# Patient Record
Sex: MALE | Race: WHITE | NOT HISPANIC OR LATINO | Employment: OTHER | URBAN - METROPOLITAN AREA
[De-identification: names, ages, dates, MRNs, and addresses within clinical notes are randomized per-mention and may not be internally consistent; named-entity substitution may affect disease eponyms.]

---

## 2023-03-03 LAB
ALBUMIN (MG/L) IN URINE: 80 MG/L
ALBUMIN/CREATININE (UG/MG) IN URINE: 138.9 UG/MG CRT (ref 0–30)
APPEARANCE, URINE: ABNORMAL
BILIRUBIN, URINE: NEGATIVE
BLOOD, URINE: ABNORMAL
CALCIDIOL (25 OH VITAMIN D3) (NG/ML) IN SER/PLAS: 92 NG/ML
COLOR, URINE: ABNORMAL
CREATININE (MG/DL) IN URINE: 57.6 MG/DL (ref 20–370)
ERYTHROCYTE DISTRIBUTION WIDTH (RATIO) BY AUTOMATED COUNT: 16.6 % (ref 11.5–14.5)
ERYTHROCYTE MEAN CORPUSCULAR HEMOGLOBIN CONCENTRATION (G/DL) BY AUTOMATED: 31.9 G/DL (ref 32–36)
ERYTHROCYTE MEAN CORPUSCULAR VOLUME (FL) BY AUTOMATED COUNT: 85 FL (ref 80–100)
ERYTHROCYTES (10*6/UL) IN BLOOD BY AUTOMATED COUNT: 4.84 X10E12/L (ref 4.5–5.9)
GLUCOSE, URINE: NEGATIVE MG/DL
HEMATOCRIT (%) IN BLOOD BY AUTOMATED COUNT: 41.1 % (ref 41–52)
HEMOGLOBIN (G/DL) IN BLOOD: 13.1 G/DL (ref 13.5–17.5)
KETONES, URINE: NEGATIVE MG/DL
LEUKOCYTE ESTERASE, URINE: ABNORMAL
LEUKOCYTES (10*3/UL) IN BLOOD BY AUTOMATED COUNT: 4.7 X10E9/L (ref 4.4–11.3)
NITRITE, URINE: NEGATIVE
PARATHYRIN INTACT (PG/ML) IN SER/PLAS: 84.4 PG/ML (ref 18.5–88)
PH, URINE: 8 (ref 5–8)
PLATELETS (10*3/UL) IN BLOOD AUTOMATED COUNT: 166 X10E9/L (ref 150–450)
PROTEIN, URINE: ABNORMAL MG/DL
RBC, URINE: >182 /HPF (ref 0–5)
SPECIFIC GRAVITY, URINE: 1.01 (ref 1–1.03)
UROBILINOGEN, URINE: <2 MG/DL (ref 0–1.9)
WBC, URINE: 64 /HPF (ref 0–5)

## 2023-03-13 DIAGNOSIS — E55.9 VITAMIN D DEFICIENCY: Primary | ICD-10-CM

## 2023-03-13 RX ORDER — FINASTERIDE 5 MG/1
1 TABLET, FILM COATED ORAL DAILY
COMMUNITY
Start: 2023-03-07 | End: 2024-02-19 | Stop reason: SDUPTHER

## 2023-03-13 RX ORDER — ACETAMINOPHEN 500 MG
50 TABLET ORAL DAILY
Qty: 30 CAPSULE | Refills: 2 | Status: SHIPPED | OUTPATIENT
Start: 2023-03-13 | End: 2023-04-12

## 2023-03-13 RX ORDER — PANTOPRAZOLE SODIUM 40 MG/1
1 TABLET, DELAYED RELEASE ORAL DAILY
COMMUNITY
Start: 2019-01-30 | End: 2023-06-27 | Stop reason: ALTCHOICE

## 2023-03-13 RX ORDER — FUROSEMIDE 40 MG/1
1 TABLET ORAL DAILY
COMMUNITY
Start: 2022-05-03 | End: 2023-06-27 | Stop reason: ALTCHOICE

## 2023-03-13 RX ORDER — CARVEDILOL 3.12 MG/1
1 TABLET ORAL 2 TIMES DAILY
COMMUNITY
Start: 2019-01-30 | End: 2023-06-27 | Stop reason: SINTOL

## 2023-03-13 RX ORDER — ATORVASTATIN CALCIUM 10 MG/1
1 TABLET, FILM COATED ORAL DAILY
COMMUNITY
Start: 2019-01-30 | End: 2023-05-08

## 2023-03-13 RX ORDER — ALBUTEROL SULFATE 90 UG/1
AEROSOL, METERED RESPIRATORY (INHALATION)
COMMUNITY
Start: 2021-06-04 | End: 2023-06-27 | Stop reason: ALTCHOICE

## 2023-03-13 RX ORDER — OMEGA-3-ACID ETHYL ESTERS 1 G/1
1 CAPSULE, LIQUID FILLED ORAL DAILY
Status: ON HOLD | COMMUNITY
Start: 2019-12-09 | End: 2024-02-29 | Stop reason: ALTCHOICE

## 2023-03-13 RX ORDER — LOSARTAN POTASSIUM 25 MG/1
1 TABLET ORAL DAILY
COMMUNITY
Start: 2022-05-03

## 2023-03-13 RX ORDER — FLUTICASONE FUROATE, UMECLIDINIUM BROMIDE AND VILANTEROL TRIFENATATE 200; 62.5; 25 UG/1; UG/1; UG/1
1 POWDER RESPIRATORY (INHALATION) DAILY
COMMUNITY
Start: 2021-11-01 | End: 2023-06-27 | Stop reason: ALTCHOICE

## 2023-03-13 RX ORDER — ACETAMINOPHEN 500 MG
1 TABLET ORAL DAILY
COMMUNITY
Start: 2021-09-03 | End: 2023-03-13 | Stop reason: SDUPTHER

## 2023-03-13 RX ORDER — ISOSORBIDE MONONITRATE 30 MG/1
1 TABLET, EXTENDED RELEASE ORAL DAILY
COMMUNITY
Start: 2021-06-28

## 2023-03-13 RX ORDER — RAMELTEON 8 MG/1
1 TABLET ORAL NIGHTLY PRN
COMMUNITY
Start: 2018-09-14 | End: 2023-07-10

## 2023-03-13 RX ORDER — FLUTICASONE PROPIONATE 50 MCG
SPRAY, SUSPENSION (ML) NASAL
COMMUNITY
Start: 2022-07-15 | End: 2023-05-30

## 2023-03-13 RX ORDER — FERROUS SULFATE 325(65) MG
1 TABLET ORAL 2 TIMES DAILY
COMMUNITY
Start: 2022-07-21

## 2023-03-13 RX ORDER — ACETAMINOPHEN 325 MG/1
TABLET ORAL EVERY 6 HOURS PRN
COMMUNITY
Start: 2019-01-12

## 2023-03-13 RX ORDER — ASPIRIN 81 MG/1
1 TABLET ORAL DAILY
COMMUNITY
Start: 2019-01-12

## 2023-04-15 DIAGNOSIS — N13.8 BPH WITH URINARY OBSTRUCTION: Primary | ICD-10-CM

## 2023-04-15 DIAGNOSIS — N40.1 BPH WITH URINARY OBSTRUCTION: Primary | ICD-10-CM

## 2023-04-17 ENCOUNTER — APPOINTMENT (OUTPATIENT)
Dept: LAB | Facility: LAB | Age: 83
End: 2023-04-17
Payer: MEDICARE

## 2023-04-17 PROBLEM — R60.0 BILATERAL LEG EDEMA: Status: ACTIVE | Noted: 2023-04-17

## 2023-04-17 PROBLEM — I35.2 AORTIC INSUFFICIENCY WITH AORTIC STENOSIS: Status: ACTIVE | Noted: 2023-04-17

## 2023-04-17 PROBLEM — N40.1 BPH WITH URINARY OBSTRUCTION: Status: ACTIVE | Noted: 2023-04-17

## 2023-04-17 PROBLEM — N17.9 AKI (ACUTE KIDNEY INJURY) (CMS-HCC): Status: ACTIVE | Noted: 2023-04-17

## 2023-04-17 PROBLEM — N13.8 BPH WITH URINARY OBSTRUCTION: Status: ACTIVE | Noted: 2023-04-17

## 2023-04-17 PROBLEM — J44.9 COPD (CHRONIC OBSTRUCTIVE PULMONARY DISEASE) (MULTI): Status: ACTIVE | Noted: 2023-04-17

## 2023-04-17 PROBLEM — K21.9 GASTROESOPHAGEAL REFLUX DISEASE: Status: ACTIVE | Noted: 2021-06-09

## 2023-04-17 PROBLEM — I50.9 CONGESTIVE HEART FAILURE (MULTI): Status: ACTIVE | Noted: 2021-06-09

## 2023-04-17 LAB
ALBUMIN (G/DL) IN SER/PLAS: 4.1 G/DL (ref 3.4–5)
ALBUMIN (MG/L) IN URINE: 21.3 MG/L
ALBUMIN/CREATININE (UG/MG) IN URINE: 72 UG/MG CRT (ref 0–30)
ANION GAP IN SER/PLAS: 12 MMOL/L (ref 10–20)
APPEARANCE, URINE: CLEAR
BASOPHILS (10*3/UL) IN BLOOD BY AUTOMATED COUNT: 0.03 X10E9/L (ref 0–0.1)
BASOPHILS/100 LEUKOCYTES IN BLOOD BY AUTOMATED COUNT: 0.7 % (ref 0–2)
BILIRUBIN, URINE: NEGATIVE
BLOOD, URINE: NEGATIVE
CALCIUM (MG/DL) IN SER/PLAS: 9.3 MG/DL (ref 8.6–10.3)
CARBON DIOXIDE, TOTAL (MMOL/L) IN SER/PLAS: 33 MMOL/L (ref 21–32)
CHLORIDE (MMOL/L) IN SER/PLAS: 101 MMOL/L (ref 98–107)
COLOR, URINE: ABNORMAL
CREATININE (MG/DL) IN SER/PLAS: 2.22 MG/DL (ref 0.5–1.3)
CREATININE (MG/DL) IN URINE: 29.6 MG/DL (ref 20–370)
EOSINOPHILS (10*3/UL) IN BLOOD BY AUTOMATED COUNT: 0.16 X10E9/L (ref 0–0.4)
EOSINOPHILS/100 LEUKOCYTES IN BLOOD BY AUTOMATED COUNT: 3.7 % (ref 0–6)
ERYTHROCYTE DISTRIBUTION WIDTH (RATIO) BY AUTOMATED COUNT: 13.8 % (ref 11.5–14.5)
ERYTHROCYTE MEAN CORPUSCULAR HEMOGLOBIN CONCENTRATION (G/DL) BY AUTOMATED: 32 G/DL (ref 32–36)
ERYTHROCYTE MEAN CORPUSCULAR VOLUME (FL) BY AUTOMATED COUNT: 88 FL (ref 80–100)
ERYTHROCYTES (10*6/UL) IN BLOOD BY AUTOMATED COUNT: 4.4 X10E12/L (ref 4.5–5.9)
FERRITIN (UG/LL) IN SER/PLAS: 20 UG/L (ref 20–300)
GFR MALE: 29 ML/MIN/1.73M2
GLUCOSE (MG/DL) IN SER/PLAS: 104 MG/DL (ref 74–99)
GLUCOSE, URINE: NEGATIVE MG/DL
HEMATOCRIT (%) IN BLOOD BY AUTOMATED COUNT: 38.8 % (ref 41–52)
HEMOGLOBIN (G/DL) IN BLOOD: 12.4 G/DL (ref 13.5–17.5)
HYALINE CASTS, URINE: ABNORMAL /LPF
IMMATURE GRANULOCYTES/100 LEUKOCYTES IN BLOOD BY AUTOMATED COUNT: 0.2 % (ref 0–0.9)
IRON (UG/DL) IN SER/PLAS: 46 UG/DL (ref 35–150)
IRON BINDING CAPACITY (UG/DL) IN SER/PLAS: 363 UG/DL (ref 240–445)
IRON SATURATION (%) IN SER/PLAS: 13 % (ref 25–45)
KETONES, URINE: NEGATIVE MG/DL
LEUKOCYTE ESTERASE, URINE: ABNORMAL
LEUKOCYTES (10*3/UL) IN BLOOD BY AUTOMATED COUNT: 4.3 X10E9/L (ref 4.4–11.3)
LYMPHOCYTES (10*3/UL) IN BLOOD BY AUTOMATED COUNT: 0.89 X10E9/L (ref 0.8–3)
LYMPHOCYTES/100 LEUKOCYTES IN BLOOD BY AUTOMATED COUNT: 20.5 % (ref 13–44)
MONOCYTES (10*3/UL) IN BLOOD BY AUTOMATED COUNT: 0.75 X10E9/L (ref 0.05–0.8)
MONOCYTES/100 LEUKOCYTES IN BLOOD BY AUTOMATED COUNT: 17.3 % (ref 2–10)
MUCUS, URINE: ABNORMAL /LPF
NEUTROPHILS (10*3/UL) IN BLOOD BY AUTOMATED COUNT: 2.5 X10E9/L (ref 1.6–5.5)
NEUTROPHILS/100 LEUKOCYTES IN BLOOD BY AUTOMATED COUNT: 57.6 % (ref 40–80)
NITRITE, URINE: NEGATIVE
PH, URINE: 7 (ref 5–8)
PHOSPHATE (MG/DL) IN SER/PLAS: 3.8 MG/DL (ref 2.5–4.9)
PLATELETS (10*3/UL) IN BLOOD AUTOMATED COUNT: 155 X10E9/L (ref 150–450)
POTASSIUM (MMOL/L) IN SER/PLAS: 4.4 MMOL/L (ref 3.5–5.3)
PROTEIN, URINE: NEGATIVE MG/DL
RBC, URINE: 2 /HPF (ref 0–5)
SODIUM (MMOL/L) IN SER/PLAS: 142 MMOL/L (ref 136–145)
SPECIFIC GRAVITY, URINE: 1.01 (ref 1–1.03)
UREA NITROGEN (MG/DL) IN SER/PLAS: 41 MG/DL (ref 6–23)
UROBILINOGEN, URINE: <2 MG/DL (ref 0–1.9)
WBC, URINE: 34 /HPF (ref 0–5)

## 2023-04-17 RX ORDER — OMEPRAZOLE 20 MG/1
CAPSULE, DELAYED RELEASE ORAL
COMMUNITY
Start: 2018-06-22 | End: 2023-06-27 | Stop reason: ALTCHOICE

## 2023-04-17 RX ORDER — TORSEMIDE 20 MG/1
20 TABLET ORAL DAILY
COMMUNITY
Start: 2023-02-23

## 2023-04-17 RX ORDER — ASCORBIC ACID 500 MG
1000 TABLET ORAL EVERY 24 HOURS
COMMUNITY
Start: 2017-08-14

## 2023-04-17 RX ORDER — ERGOCALCIFEROL 1.25 MG/1
CAPSULE ORAL
COMMUNITY
Start: 2018-06-22 | End: 2023-06-27 | Stop reason: ALTCHOICE

## 2023-04-17 RX ORDER — WARFARIN SODIUM 5 MG/1
TABLET ORAL
COMMUNITY
Start: 2018-06-22 | End: 2023-06-27 | Stop reason: ALTCHOICE

## 2023-04-17 RX ORDER — TAMSULOSIN HYDROCHLORIDE 0.4 MG/1
CAPSULE ORAL
Qty: 90 CAPSULE | Refills: 0 | Status: SHIPPED | OUTPATIENT
Start: 2023-04-17 | End: 2023-07-10

## 2023-04-17 RX ORDER — SUCRALFATE 1 G/1
TABLET ORAL
COMMUNITY
Start: 2018-06-22 | End: 2023-06-27 | Stop reason: ALTCHOICE

## 2023-04-17 RX ORDER — TAMSULOSIN HYDROCHLORIDE 0.4 MG/1
0.4 CAPSULE ORAL
COMMUNITY
Start: 2018-06-22 | End: 2023-06-27 | Stop reason: SDUPTHER

## 2023-04-17 RX ORDER — HYDRALAZINE HYDROCHLORIDE 25 MG/1
TABLET, FILM COATED ORAL
COMMUNITY
Start: 2018-06-22 | End: 2023-06-27 | Stop reason: ALTCHOICE

## 2023-04-17 RX ORDER — FAMOTIDINE 20 MG/1
1 TABLET, FILM COATED ORAL DAILY
COMMUNITY
Start: 2023-03-29

## 2023-04-17 RX ORDER — FUROSEMIDE 20 MG/1
20 TABLET ORAL 2 TIMES DAILY
COMMUNITY
Start: 2019-11-12 | End: 2024-02-27 | Stop reason: ALTCHOICE

## 2023-04-17 RX ORDER — WARFARIN 1 MG/1
TABLET ORAL
COMMUNITY
Start: 2018-06-22 | End: 2023-06-27 | Stop reason: ALTCHOICE

## 2023-04-17 RX ORDER — DAPAGLIFLOZIN 10 MG/1
1 TABLET, FILM COATED ORAL DAILY
COMMUNITY
Start: 2022-06-16 | End: 2023-06-27 | Stop reason: SINTOL

## 2023-04-17 RX ORDER — LISINOPRIL 5 MG/1
TABLET ORAL
COMMUNITY
Start: 2018-06-22 | End: 2023-06-27 | Stop reason: ALTCHOICE

## 2023-04-20 LAB — ERYTHROPOIETIN: 11 MU/ML (ref 4–27)

## 2023-05-06 DIAGNOSIS — E78.00 HYPERCHOLESTEROLEMIA: Primary | ICD-10-CM

## 2023-05-08 RX ORDER — ATORVASTATIN CALCIUM 10 MG/1
10 TABLET, FILM COATED ORAL DAILY
Qty: 90 TABLET | Refills: 0 | Status: SHIPPED | OUTPATIENT
Start: 2023-05-08 | End: 2023-08-07

## 2023-05-08 RX ORDER — ACETAMINOPHEN 500 MG
1 TABLET ORAL DAILY
COMMUNITY
Start: 2021-09-03 | End: 2024-04-22

## 2023-05-28 DIAGNOSIS — R09.82 POSTNASAL DRIP: Primary | ICD-10-CM

## 2023-05-30 RX ORDER — FLUTICASONE PROPIONATE 50 MCG
SPRAY, SUSPENSION (ML) NASAL
Qty: 16 G | Refills: 0 | Status: SHIPPED | OUTPATIENT
Start: 2023-05-30 | End: 2023-06-22

## 2023-06-21 DIAGNOSIS — R09.82 POSTNASAL DRIP: ICD-10-CM

## 2023-06-22 RX ORDER — FLUTICASONE PROPIONATE 50 MCG
SPRAY, SUSPENSION (ML) NASAL
Qty: 16 G | Refills: 0 | Status: SHIPPED | OUTPATIENT
Start: 2023-06-22 | End: 2023-07-17

## 2023-06-26 RX ORDER — PERPHENAZINE 2 MG
TABLET ORAL
COMMUNITY
Start: 2023-06-21

## 2023-06-26 ASSESSMENT — ENCOUNTER SYMPTOMS
COLOR CHANGE: 0
APPETITE CHANGE: 0
CHILLS: 0
FEVER: 0
NERVOUS/ANXIOUS: 0
SHORTNESS OF BREATH: 0
ANAL BLEEDING: 0
DIARRHEA: 0
CHOKING: 0
FREQUENCY: 0
COUGH: 0
HEADACHES: 0
WHEEZING: 0
VOMITING: 0
CHEST TIGHTNESS: 0
MYALGIAS: 0
EYE PAIN: 0
ABDOMINAL PAIN: 0
EYE DISCHARGE: 0
ARTHRALGIAS: 0
PALPITATIONS: 0
ABDOMINAL DISTENTION: 0
DIFFICULTY URINATING: 0
CONSTIPATION: 0
HEMATURIA: 0
SORE THROAT: 0
WEAKNESS: 0
NAUSEA: 0
DIZZINESS: 0
FACIAL SWELLING: 0
CONFUSION: 0
SLEEP DISTURBANCE: 0
TREMORS: 0
POLYPHAGIA: 0
NUMBNESS: 0
FATIGUE: 0
JOINT SWELLING: 0
POLYDIPSIA: 0

## 2023-06-26 NOTE — PROGRESS NOTES
Subjective   Patient ID: Nahum Steward is a 83 y.o. male with a history of History of CABG in 2000, coronary artery bypass graft surgery with   saphenous vein graft to right coronary artery, replacement of mechanical valve with tissue valve in 2019, hypertension, CHF, insomnia, hyperlipidemia, APRYL is presented who presents for Follow-up.    HPI the patient lost 35 pounds since January.  Stated he is on the intermittent fasting and feels a lot better.  His shortness of breath also improved and he is off of Trelegy.  His carvedilol was discontinued by cardiology due to bradycardia.  Stated blood pressure is well controlled. His gross hematuria had resolved after he stopped Farxiga.  Stated he occasionally had hematuria and he was started on finasteride by urology which improved it.  He denies leg swelling.  Takes furosemide daily.  His anemia had improved.  He follows with hematology regularly.  Currently on oral iron supplement.    Review of Systems   Constitutional:  Negative for appetite change, chills, fatigue and fever.   HENT:  Negative for congestion, ear pain, facial swelling, hearing loss, nosebleeds and sore throat.    Eyes:  Negative for pain, discharge and visual disturbance.   Respiratory:  Negative for cough, choking, chest tightness, shortness of breath and wheezing.    Cardiovascular:  Negative for chest pain, palpitations and leg swelling.   Gastrointestinal:  Negative for abdominal distention, abdominal pain, anal bleeding, constipation, diarrhea, nausea and vomiting.   Endocrine: Negative for cold intolerance, heat intolerance, polydipsia, polyphagia and polyuria.   Genitourinary:  Negative for difficulty urinating, frequency, hematuria and urgency.   Musculoskeletal:  Negative for arthralgias, gait problem, joint swelling and myalgias.   Skin:  Negative for color change and rash.   Neurological:  Negative for dizziness, tremors, syncope, weakness, numbness and headaches.   Psychiatric/Behavioral:   "Negative for behavioral problems, confusion, sleep disturbance and suicidal ideas. The patient is not nervous/anxious.        Objective   /72   Temp 36.2 °C (97.2 °F)   Ht 1.689 m (5' 6.5\")   Wt 84.4 kg (186 lb)   BMI 29.57 kg/m²     Physical Exam  Constitutional:       General: He is not in acute distress.     Appearance: Normal appearance.   HENT:      Head: Normocephalic and atraumatic.      Nose: Nose normal.   Eyes:      Extraocular Movements: Extraocular movements intact.      Conjunctiva/sclera: Conjunctivae normal.      Pupils: Pupils are equal, round, and reactive to light.   Cardiovascular:      Rate and Rhythm: Normal rate and regular rhythm.      Pulses: Normal pulses.      Heart sounds: Normal heart sounds.   Pulmonary:      Effort: Pulmonary effort is normal.      Breath sounds: Normal breath sounds.   Abdominal:      General: Bowel sounds are normal.      Palpations: Abdomen is soft.   Musculoskeletal:         General: Normal range of motion.      Cervical back: Normal range of motion and neck supple.   Neurological:      General: No focal deficit present.      Mental Status: He is alert and oriented to person, place, and time.   Psychiatric:         Mood and Affect: Mood normal.         Behavior: Behavior normal.         Thought Content: Thought content normal.         Judgment: Judgment normal.         Assessment/Plan     Hematuria  off of Farxiga  Resolved   cytology showed acute inflammation  Started on finasteride 5 mg by urology  follow up with urology as scheduled     BPH  Continue tamsulosin 0.4 mg once daily  Started on Finasteride 5 mg by urology  Follow-up with urology Dr. Meehan     Anemia   Improved  Continue oral iron supplement twice daily  Follow-up with hematology    Leg edema  Stable  Elevate your feet  Wear compression stockings  Continue furosemide 40 mg daily     COPD  Shortness of breath improved after losing 35 pounds  Off of Trelegy  Continue albuterol as " needed  follow up with pulmonology      CHF  Stable  Continue furosemide 20 mg twice daily  Continue losartan 25 mg daily  Off of carvedilol 3.125 mg due to bradycardia  Check stress test done in 2019  Follow-up with cardiology Dr. Garcia as scheduled     History of CABG in 2000,   coronary artery bypass graft surgery with   saphenous vein graft to right coronary artery,   replacement of mechanical valve with tissue valve in 2019,   Stable now  Continue blood pressure medications  Continue aspirin 81 mg daily  Continue atorvastatin 10 mg daily  Follow-up with cardiology as scheduled     High blood pressure   Off of carvedilol 3.125 mg due to bradycardia  Continue furosemide 40 mg daily  Continue losartan 25 mg daily  Monitor blood pressure daily  No added salt diet, do not add salt to your food  Try to exercise every other day for 30 minutes     Dyslipidemia  Continue with the low fat, low cholesterol diet  I recommend Mediterranean diet, which include fish, chicken, vegetables and olive oil  Exercise daily for 30 minutes at least 3 times a week  Continue atorvastatin 10 mg daily  Continue omega 3 daily 1000 mg daily    Acid reflux  Avoid caffeine and alcoholic beverages  Avoid spicy and acidic food, such as tomato and citrus fruits  Avoid onions, peppermint and spearmint   Eat small, frequent meals  Do not eat late at night, at least 3 hours before bed  Raise the head of the bed 6-8 inches for sleeping  Wear lose-fitting clothes around your waist   Continue famotidine 20 mg twice daily before meals     Insomnia  Continue ramelteon 8 mg at bedtime    BMI 29.57 kg/m2  Lost 35 pounds with intermittent fasting  Low fat, low cholesterol diet, low carbohydrate diet  Exercise at least 30 minutes daily     Wellness exam  Colonoscopy done in 2018 repeat in 5 years, patient declined since last colonoscopy was normal  Pneumovax 9/2/2022  Flu vaccine 7/1/2022  COVID-vaccine 3/29/2022  See your dentist twice a year  Yearly eye  exam  see dermatology once a year for a skin check.    Please get your Living will / Advance directive completed. Please make sure our office has a copy of the latest one.

## 2023-06-27 ENCOUNTER — OFFICE VISIT (OUTPATIENT)
Dept: PRIMARY CARE | Facility: CLINIC | Age: 83
End: 2023-06-27
Payer: MEDICARE

## 2023-06-27 VITALS
SYSTOLIC BLOOD PRESSURE: 130 MMHG | HEIGHT: 67 IN | BODY MASS INDEX: 29.19 KG/M2 | TEMPERATURE: 97.2 F | WEIGHT: 186 LBS | DIASTOLIC BLOOD PRESSURE: 72 MMHG

## 2023-06-27 DIAGNOSIS — I73.9 INTERMITTENT CLAUDICATION (CMS-HCC): ICD-10-CM

## 2023-06-27 DIAGNOSIS — F51.01 PRIMARY INSOMNIA: ICD-10-CM

## 2023-06-27 DIAGNOSIS — R35.1 NOCTURIA: ICD-10-CM

## 2023-06-27 DIAGNOSIS — R26.81 UNSTEADY GAIT: ICD-10-CM

## 2023-06-27 DIAGNOSIS — N40.1 BPH WITH URINARY OBSTRUCTION: ICD-10-CM

## 2023-06-27 DIAGNOSIS — I10 BENIGN ESSENTIAL HYPERTENSION: ICD-10-CM

## 2023-06-27 DIAGNOSIS — I50.9 CONGESTIVE HEART FAILURE, UNSPECIFIED HF CHRONICITY, UNSPECIFIED HEART FAILURE TYPE (MULTI): ICD-10-CM

## 2023-06-27 DIAGNOSIS — D50.0 IRON DEFICIENCY ANEMIA DUE TO CHRONIC BLOOD LOSS: ICD-10-CM

## 2023-06-27 DIAGNOSIS — Z00.00 ROUTINE GENERAL MEDICAL EXAMINATION AT HEALTH CARE FACILITY: Primary | ICD-10-CM

## 2023-06-27 DIAGNOSIS — J41.0 SIMPLE CHRONIC BRONCHITIS (MULTI): ICD-10-CM

## 2023-06-27 DIAGNOSIS — N17.9 AKI (ACUTE KIDNEY INJURY) (CMS-HCC): ICD-10-CM

## 2023-06-27 DIAGNOSIS — K21.9 GASTROESOPHAGEAL REFLUX DISEASE WITHOUT ESOPHAGITIS: ICD-10-CM

## 2023-06-27 DIAGNOSIS — R60.0 BILATERAL LEG EDEMA: ICD-10-CM

## 2023-06-27 DIAGNOSIS — N13.8 BPH WITH URINARY OBSTRUCTION: ICD-10-CM

## 2023-06-27 PROBLEM — R91.1 LUNG NODULE: Status: ACTIVE | Noted: 2023-06-27

## 2023-06-27 PROBLEM — R09.82 POST-NASAL DRIP: Status: ACTIVE | Noted: 2023-06-27

## 2023-06-27 PROBLEM — I44.1 MOBITZ TYPE II ATRIOVENTRICULAR BLOCK: Status: ACTIVE | Noted: 2018-06-06

## 2023-06-27 PROBLEM — E78.2 MIXED HYPERLIPIDEMIA: Status: ACTIVE | Noted: 2023-06-27

## 2023-06-27 PROBLEM — Z95.2 H/O AORTIC VALVE REPLACEMENT: Status: ACTIVE | Noted: 2023-06-27

## 2023-06-27 PROCEDURE — 99214 OFFICE O/P EST MOD 30 MIN: CPT | Performed by: PHYSICIAN ASSISTANT

## 2023-06-27 PROCEDURE — 1160F RVW MEDS BY RX/DR IN RCRD: CPT | Performed by: PHYSICIAN ASSISTANT

## 2023-06-27 PROCEDURE — 1157F ADVNC CARE PLAN IN RCRD: CPT | Performed by: PHYSICIAN ASSISTANT

## 2023-06-27 PROCEDURE — 1159F MED LIST DOCD IN RCRD: CPT | Performed by: PHYSICIAN ASSISTANT

## 2023-06-27 PROCEDURE — 1036F TOBACCO NON-USER: CPT | Performed by: PHYSICIAN ASSISTANT

## 2023-06-27 PROCEDURE — 3078F DIAST BP <80 MM HG: CPT | Performed by: PHYSICIAN ASSISTANT

## 2023-06-27 PROCEDURE — 1170F FXNL STATUS ASSESSED: CPT | Performed by: PHYSICIAN ASSISTANT

## 2023-06-27 PROCEDURE — 3075F SYST BP GE 130 - 139MM HG: CPT | Performed by: PHYSICIAN ASSISTANT

## 2023-06-27 PROCEDURE — G0439 PPPS, SUBSEQ VISIT: HCPCS | Performed by: PHYSICIAN ASSISTANT

## 2023-06-27 ASSESSMENT — ACTIVITIES OF DAILY LIVING (ADL)
FEEDING: INDEPENDENT
HEARING - LEFT EAR: FUNCTIONAL
NEEDS ASSISTANCE WITH FOOD: INDEPENDENT
TAKING MEDICATION: INDEPENDENT
BATHING: INDEPENDENT
PREPARING MEALS: INDEPENDENT
GROCERY SHOPPING: INDEPENDENT
DRESSING YOURSELF: INDEPENDENT
FEEDING YOURSELF: INDEPENDENT
EATING: INDEPENDENT
ADEQUATE_TO_COMPLETE_ADL: YES
BATHING: INDEPENDENT
STIL DRIVING: NO
JUDGMENT_ADEQUATE_SAFELY_COMPLETE_DAILY_ACTIVITIES: YES
USING TELEPHONE: INDEPENDENT
GROOMING: INDEPENDENT
MANAGING FINANCES: INDEPENDENT
ADEQUATE_TO_COMPLETE_ADL: YES
WALKS IN HOME: INDEPENDENT
HEARING - RIGHT EAR: FUNCTIONAL
TOILETING: INDEPENDENT
USING TRANSPORTATION: NEEDS ASSISTANCE
TOILETING: INDEPENDENT
PILL BOX USED: YES
JUDGMENT_ADEQUATE_SAFELY_COMPLETE_DAILY_ACTIVITIES: YES
DRESSING: INDEPENDENT
DOING HOUSEWORK: INDEPENDENT
PATIENT'S MEMORY ADEQUATE TO SAFELY COMPLETE DAILY ACTIVITIES?: YES

## 2023-06-27 ASSESSMENT — ENCOUNTER SYMPTOMS
OCCASIONAL FEELINGS OF UNSTEADINESS: 0
LOSS OF SENSATION IN FEET: 0
DEPRESSION: 0

## 2023-06-27 ASSESSMENT — GERIATRIC MINI NUTRITIONAL ASSESSMENT (MNA)
A HAS FOOD INTAKE DECLINED OVER THE PAST 3 MONTHS DUE TO LOSS OF APPETITE, DIGESTIVE PROBLEMS, CHEWING OR SWALLOWING DIFFICULTIES?: NO DECREASE IN FOOD INTAKE
C GENERAL MOBILITY: GOES OUT
D HAS SUFFERED PSYCHOLOGICAL STRESS OR ACUTE DISEASE IN THE PAST 3 MONTHS?: NO
B WEIGHT LOSS DURING THE LAST 3 MONTHS: NO WEIGHT LOSS
E NEUROPSYCHOLOGICAL PROBLEMS: NO PSYCHOLOGICAL PROBLEMS

## 2023-06-27 ASSESSMENT — PATIENT HEALTH QUESTIONNAIRE - PHQ9
2. FEELING DOWN, DEPRESSED OR HOPELESS: NOT AT ALL
1. LITTLE INTEREST OR PLEASURE IN DOING THINGS: NOT AT ALL
SUM OF ALL RESPONSES TO PHQ9 QUESTIONS 1 AND 2: 0

## 2023-06-27 ASSESSMENT — COLUMBIA-SUICIDE SEVERITY RATING SCALE - C-SSRS
1. IN THE PAST MONTH, HAVE YOU WISHED YOU WERE DEAD OR WISHED YOU COULD GO TO SLEEP AND NOT WAKE UP?: NO
6. HAVE YOU EVER DONE ANYTHING, STARTED TO DO ANYTHING, OR PREPARED TO DO ANYTHING TO END YOUR LIFE?: NO
2. HAVE YOU ACTUALLY HAD ANY THOUGHTS OF KILLING YOURSELF?: NO

## 2023-06-27 ASSESSMENT — COGNITIVE AND FUNCTIONAL STATUS - GENERAL: TRAIL MAKING TEST: PATIENT COMPLETES TRAIL MAKING TEST PROPERLY.

## 2023-06-27 NOTE — PROGRESS NOTES
"Subjective   Reason for Visit: Nahum Steward is an 83 y.o. male here for a Medicare Wellness visit.     Past Medical, Surgical, and Family History reviewed and updated in chart.    Reviewed all medications by prescribing practitioner or clinical pharmacist (such as prescriptions, OTCs, herbal therapies and supplements) and documented in the medical record.    HPI    Patient Care Team:  Kim Dill PA-C as PCP - General     Review of Systems    Objective   Vitals:  /72   Temp 36.2 °C (97.2 °F)   Ht 1.689 m (5' 6.5\")   Wt 84.4 kg (186 lb)   BMI 29.57 kg/m²       Physical Exam    Assessment/Plan   Problem List Items Addressed This Visit       APRYL (acute kidney injury) (CMS/HCC)    Benign essential hypertension    Bilateral leg edema    BPH with urinary obstruction    Congestive heart failure (CMS/HCC)    COPD (chronic obstructive pulmonary disease) (CMS/HCC)    Gastroesophageal reflux disease    Insomnia    Intermittent claudication (CMS/HCC)    Unsteady gait    Iron deficiency anemia    Nocturia    Routine general medical examination at health care facility - Primary    Relevant Orders    1 Year Follow Up In Primary Care - Wellness Exam          "

## 2023-07-07 DIAGNOSIS — F51.01 PRIMARY INSOMNIA: Primary | ICD-10-CM

## 2023-07-09 DIAGNOSIS — N40.1 BPH WITH URINARY OBSTRUCTION: ICD-10-CM

## 2023-07-09 DIAGNOSIS — N13.8 BPH WITH URINARY OBSTRUCTION: ICD-10-CM

## 2023-07-10 RX ORDER — RAMELTEON 8 MG/1
TABLET ORAL
Qty: 90 TABLET | Refills: 0 | Status: SHIPPED | OUTPATIENT
Start: 2023-07-10 | End: 2023-10-10

## 2023-07-10 RX ORDER — TAMSULOSIN HYDROCHLORIDE 0.4 MG/1
CAPSULE ORAL
Qty: 90 CAPSULE | Refills: 3 | Status: SHIPPED | OUTPATIENT
Start: 2023-07-10

## 2023-07-16 DIAGNOSIS — R09.82 POSTNASAL DRIP: ICD-10-CM

## 2023-07-17 RX ORDER — FLUTICASONE PROPIONATE 50 MCG
SPRAY, SUSPENSION (ML) NASAL
Qty: 16 G | Refills: 0 | Status: SHIPPED | OUTPATIENT
Start: 2023-07-17 | End: 2023-09-14

## 2023-08-05 DIAGNOSIS — E78.00 HYPERCHOLESTEROLEMIA: ICD-10-CM

## 2023-08-07 RX ORDER — ATORVASTATIN CALCIUM 10 MG/1
10 TABLET, FILM COATED ORAL DAILY
Qty: 90 TABLET | Refills: 0 | Status: SHIPPED | OUTPATIENT
Start: 2023-08-07 | End: 2023-10-16

## 2023-08-28 LAB
ALBUMIN (G/DL) IN SER/PLAS: 4.4 G/DL (ref 3.4–5)
ALBUMIN (MG/L) IN URINE: 35 MG/L
ALBUMIN/CREATININE (UG/MG) IN URINE: 51 UG/MG CRT (ref 0–30)
ANION GAP IN SER/PLAS: 14 MMOL/L (ref 10–20)
APPEARANCE, URINE: CLEAR
BILIRUBIN, URINE: NEGATIVE
BLOOD, URINE: NEGATIVE
CALCIDIOL (25 OH VITAMIN D3) (NG/ML) IN SER/PLAS: 64 NG/ML
CALCIUM (MG/DL) IN SER/PLAS: 9 MG/DL (ref 8.6–10.3)
CARBON DIOXIDE, TOTAL (MMOL/L) IN SER/PLAS: 29 MMOL/L (ref 21–32)
CHLORIDE (MMOL/L) IN SER/PLAS: 103 MMOL/L (ref 98–107)
COLOR, URINE: YELLOW
CREATININE (MG/DL) IN SER/PLAS: 1.96 MG/DL (ref 0.5–1.3)
CREATININE (MG/DL) IN URINE: 68.6 MG/DL (ref 20–370)
GFR MALE: 34 ML/MIN/1.73M2
GLUCOSE (MG/DL) IN SER/PLAS: 89 MG/DL (ref 74–99)
GLUCOSE, URINE: NEGATIVE MG/DL
KETONES, URINE: NEGATIVE MG/DL
LEUKOCYTE ESTERASE, URINE: ABNORMAL
MUCUS, URINE: ABNORMAL /LPF
NITRITE, URINE: NEGATIVE
PH, URINE: 6 (ref 5–8)
PHOSPHATE (MG/DL) IN SER/PLAS: 3.1 MG/DL (ref 2.5–4.9)
POTASSIUM (MMOL/L) IN SER/PLAS: 3.8 MMOL/L (ref 3.5–5.3)
PROTEIN, URINE: NEGATIVE MG/DL
RBC, URINE: 1 /HPF (ref 0–5)
SODIUM (MMOL/L) IN SER/PLAS: 142 MMOL/L (ref 136–145)
SPECIFIC GRAVITY, URINE: 1.01 (ref 1–1.03)
UREA NITROGEN (MG/DL) IN SER/PLAS: 40 MG/DL (ref 6–23)
UROBILINOGEN, URINE: <2 MG/DL (ref 0–1.9)
WBC, URINE: 25 /HPF (ref 0–5)

## 2023-09-12 DIAGNOSIS — R09.82 POSTNASAL DRIP: ICD-10-CM

## 2023-09-14 RX ORDER — FLUTICASONE PROPIONATE 50 MCG
SPRAY, SUSPENSION (ML) NASAL
Qty: 16 G | Refills: 0 | Status: SHIPPED | OUTPATIENT
Start: 2023-09-14 | End: 2023-10-09

## 2023-10-07 DIAGNOSIS — R09.82 POSTNASAL DRIP: ICD-10-CM

## 2023-10-09 DIAGNOSIS — R09.82 POSTNASAL DRIP: ICD-10-CM

## 2023-10-09 RX ORDER — FLUTICASONE PROPIONATE 50 MCG
SPRAY, SUSPENSION (ML) NASAL
Qty: 16 G | Refills: 0 | Status: SHIPPED | OUTPATIENT
Start: 2023-10-09 | End: 2023-10-09 | Stop reason: SDUPTHER

## 2023-10-09 RX ORDER — FLUTICASONE PROPIONATE 50 MCG
SPRAY, SUSPENSION (ML) NASAL
Qty: 16 G | Refills: 2 | Status: SHIPPED | OUTPATIENT
Start: 2023-10-09 | End: 2024-02-05

## 2023-10-10 DIAGNOSIS — F51.01 PRIMARY INSOMNIA: ICD-10-CM

## 2023-10-10 RX ORDER — RAMELTEON 8 MG/1
TABLET ORAL
Qty: 90 TABLET | Refills: 0 | Status: SHIPPED | OUTPATIENT
Start: 2023-10-10 | End: 2024-01-09

## 2023-10-14 DIAGNOSIS — E78.00 HYPERCHOLESTEROLEMIA: ICD-10-CM

## 2023-10-16 RX ORDER — ATORVASTATIN CALCIUM 10 MG/1
10 TABLET, FILM COATED ORAL DAILY
Qty: 90 TABLET | Refills: 0 | Status: SHIPPED | OUTPATIENT
Start: 2023-10-16 | End: 2024-02-05

## 2023-12-27 ENCOUNTER — TELEPHONE (OUTPATIENT)
Dept: PRIMARY CARE | Facility: CLINIC | Age: 83
End: 2023-12-27

## 2023-12-27 ENCOUNTER — OFFICE VISIT (OUTPATIENT)
Dept: PRIMARY CARE | Facility: CLINIC | Age: 83
End: 2023-12-27
Payer: MEDICARE

## 2023-12-27 VITALS
HEIGHT: 67 IN | DIASTOLIC BLOOD PRESSURE: 72 MMHG | WEIGHT: 196 LBS | TEMPERATURE: 96.9 F | BODY MASS INDEX: 30.76 KG/M2 | SYSTOLIC BLOOD PRESSURE: 128 MMHG

## 2023-12-27 DIAGNOSIS — J41.0 SIMPLE CHRONIC BRONCHITIS (MULTI): ICD-10-CM

## 2023-12-27 DIAGNOSIS — R26.81 UNSTEADY GAIT: ICD-10-CM

## 2023-12-27 DIAGNOSIS — I50.9 CONGESTIVE HEART FAILURE, UNSPECIFIED HF CHRONICITY, UNSPECIFIED HEART FAILURE TYPE (MULTI): ICD-10-CM

## 2023-12-27 DIAGNOSIS — R60.0 BILATERAL LEG EDEMA: ICD-10-CM

## 2023-12-27 DIAGNOSIS — E78.2 MIXED HYPERLIPIDEMIA: ICD-10-CM

## 2023-12-27 DIAGNOSIS — K21.9 GASTROESOPHAGEAL REFLUX DISEASE WITHOUT ESOPHAGITIS: ICD-10-CM

## 2023-12-27 DIAGNOSIS — I10 BENIGN ESSENTIAL HYPERTENSION: Primary | ICD-10-CM

## 2023-12-27 PROCEDURE — 80061 LIPID PANEL: CPT | Performed by: PHYSICIAN ASSISTANT

## 2023-12-27 PROCEDURE — 99214 OFFICE O/P EST MOD 30 MIN: CPT | Performed by: PHYSICIAN ASSISTANT

## 2023-12-27 PROCEDURE — 1159F MED LIST DOCD IN RCRD: CPT | Performed by: PHYSICIAN ASSISTANT

## 2023-12-27 PROCEDURE — 1160F RVW MEDS BY RX/DR IN RCRD: CPT | Performed by: PHYSICIAN ASSISTANT

## 2023-12-27 PROCEDURE — 3074F SYST BP LT 130 MM HG: CPT | Performed by: PHYSICIAN ASSISTANT

## 2023-12-27 PROCEDURE — 1036F TOBACCO NON-USER: CPT | Performed by: PHYSICIAN ASSISTANT

## 2023-12-27 PROCEDURE — 84443 ASSAY THYROID STIM HORMONE: CPT | Performed by: PHYSICIAN ASSISTANT

## 2023-12-27 PROCEDURE — 3078F DIAST BP <80 MM HG: CPT | Performed by: PHYSICIAN ASSISTANT

## 2023-12-27 PROCEDURE — 1126F AMNT PAIN NOTED NONE PRSNT: CPT | Performed by: PHYSICIAN ASSISTANT

## 2023-12-27 ASSESSMENT — ENCOUNTER SYMPTOMS
FEVER: 0
HEMATURIA: 0
NERVOUS/ANXIOUS: 0
MYALGIAS: 0
DIZZINESS: 0
EYE DISCHARGE: 0
DIFFICULTY URINATING: 0
LOSS OF SENSATION IN FEET: 0
POLYDIPSIA: 0
HEADACHES: 0
WHEEZING: 0
PALPITATIONS: 0
FREQUENCY: 0
SORE THROAT: 0
NUMBNESS: 0
OCCASIONAL FEELINGS OF UNSTEADINESS: 0
COUGH: 0
VOMITING: 0
DIARRHEA: 0
ANAL BLEEDING: 0
CHOKING: 0
ABDOMINAL PAIN: 0
ABDOMINAL DISTENTION: 0
CONSTIPATION: 0
JOINT SWELLING: 0
FATIGUE: 0
EYE PAIN: 0
CONFUSION: 0
CHEST TIGHTNESS: 0
DEPRESSION: 0
FACIAL SWELLING: 0
NAUSEA: 0
COLOR CHANGE: 0
WEAKNESS: 0
APPETITE CHANGE: 0
SHORTNESS OF BREATH: 0
SLEEP DISTURBANCE: 0
POLYPHAGIA: 0
TREMORS: 0
ARTHRALGIAS: 0
CHILLS: 0

## 2023-12-27 ASSESSMENT — PATIENT HEALTH QUESTIONNAIRE - PHQ9
SUM OF ALL RESPONSES TO PHQ9 QUESTIONS 1 AND 2: 0
2. FEELING DOWN, DEPRESSED OR HOPELESS: NOT AT ALL
1. LITTLE INTEREST OR PLEASURE IN DOING THINGS: NOT AT ALL

## 2024-01-08 DIAGNOSIS — F51.01 PRIMARY INSOMNIA: ICD-10-CM

## 2024-01-09 RX ORDER — RAMELTEON 8 MG/1
8 TABLET ORAL NIGHTLY PRN
Qty: 90 TABLET | Refills: 0 | Status: SHIPPED | OUTPATIENT
Start: 2024-01-09 | End: 2024-04-03

## 2024-01-25 ENCOUNTER — OFFICE VISIT (OUTPATIENT)
Dept: HEMATOLOGY/ONCOLOGY | Facility: CLINIC | Age: 84
End: 2024-01-25
Payer: MEDICARE

## 2024-01-25 VITALS
SYSTOLIC BLOOD PRESSURE: 165 MMHG | DIASTOLIC BLOOD PRESSURE: 52 MMHG | TEMPERATURE: 97.7 F | RESPIRATION RATE: 18 BRPM | OXYGEN SATURATION: 96 % | WEIGHT: 197.42 LBS | HEART RATE: 41 BPM | HEIGHT: 67 IN | BODY MASS INDEX: 30.99 KG/M2

## 2024-01-25 DIAGNOSIS — D50.9 IRON DEFICIENCY ANEMIA, UNSPECIFIED IRON DEFICIENCY ANEMIA TYPE: Primary | ICD-10-CM

## 2024-01-25 LAB
ALBUMIN SERPL BCP-MCNC: 4.1 G/DL (ref 3.4–5)
ALP SERPL-CCNC: 50 U/L (ref 33–136)
ALT SERPL W P-5'-P-CCNC: 10 U/L (ref 10–52)
ANION GAP SERPL CALC-SCNC: 16 MMOL/L (ref 10–20)
AST SERPL W P-5'-P-CCNC: 12 U/L (ref 9–39)
BASOPHILS # BLD AUTO: 0.02 X10*3/UL (ref 0–0.1)
BASOPHILS NFR BLD AUTO: 0.3 %
BILIRUB SERPL-MCNC: 0.5 MG/DL (ref 0–1.2)
BUN SERPL-MCNC: 38 MG/DL (ref 6–23)
CALCIUM SERPL-MCNC: 8.8 MG/DL (ref 8.6–10.3)
CHLORIDE SERPL-SCNC: 103 MMOL/L (ref 98–107)
CO2 SERPL-SCNC: 27 MMOL/L (ref 21–32)
CREAT SERPL-MCNC: 1.88 MG/DL (ref 0.5–1.3)
EGFRCR SERPLBLD CKD-EPI 2021: 35 ML/MIN/1.73M*2
EOSINOPHIL # BLD AUTO: 0.12 X10*3/UL (ref 0–0.4)
EOSINOPHIL NFR BLD AUTO: 2 %
ERYTHROCYTE [DISTWIDTH] IN BLOOD BY AUTOMATED COUNT: 12.6 % (ref 11.5–14.5)
FERRITIN SERPL-MCNC: 59 NG/ML (ref 20–300)
GLUCOSE SERPL-MCNC: 116 MG/DL (ref 74–99)
HCT VFR BLD AUTO: 42 % (ref 41–52)
HGB BLD-MCNC: 13.8 G/DL (ref 13.5–17.5)
IMM GRANULOCYTES # BLD AUTO: 0 X10*3/UL (ref 0–0.5)
IMM GRANULOCYTES NFR BLD AUTO: 0 % (ref 0–0.9)
IRON SATN MFR SERPL: 51 % (ref 25–45)
IRON SERPL-MCNC: 160 UG/DL (ref 35–150)
LYMPHOCYTES # BLD AUTO: 0.96 X10*3/UL (ref 0.8–3)
LYMPHOCYTES NFR BLD AUTO: 15.8 %
MCH RBC QN AUTO: 28.6 PG (ref 26–34)
MCHC RBC AUTO-ENTMCNC: 32.9 G/DL (ref 32–36)
MCV RBC AUTO: 87 FL (ref 80–100)
MONOCYTES # BLD AUTO: 0.69 X10*3/UL (ref 0.05–0.8)
MONOCYTES NFR BLD AUTO: 11.4 %
NEUTROPHILS # BLD AUTO: 4.27 X10*3/UL (ref 1.6–5.5)
NEUTROPHILS NFR BLD AUTO: 70.5 %
PLATELET # BLD AUTO: 150 X10*3/UL (ref 150–450)
POTASSIUM SERPL-SCNC: 3.9 MMOL/L (ref 3.5–5.3)
PROT SERPL-MCNC: 6.7 G/DL (ref 6.4–8.2)
RBC # BLD AUTO: 4.82 X10*6/UL (ref 4.5–5.9)
SODIUM SERPL-SCNC: 142 MMOL/L (ref 136–145)
TIBC SERPL-MCNC: 315 UG/DL (ref 240–445)
UIBC SERPL-MCNC: 155 UG/DL (ref 110–370)
WBC # BLD AUTO: 6.1 X10*3/UL (ref 4.4–11.3)

## 2024-01-25 PROCEDURE — 1036F TOBACCO NON-USER: CPT | Performed by: INTERNAL MEDICINE

## 2024-01-25 PROCEDURE — 3077F SYST BP >= 140 MM HG: CPT | Performed by: INTERNAL MEDICINE

## 2024-01-25 PROCEDURE — 99213 OFFICE O/P EST LOW 20 MIN: CPT | Performed by: INTERNAL MEDICINE

## 2024-01-25 PROCEDURE — 83540 ASSAY OF IRON: CPT | Performed by: INTERNAL MEDICINE

## 2024-01-25 PROCEDURE — 3078F DIAST BP <80 MM HG: CPT | Performed by: INTERNAL MEDICINE

## 2024-01-25 PROCEDURE — 1126F AMNT PAIN NOTED NONE PRSNT: CPT | Performed by: INTERNAL MEDICINE

## 2024-01-25 PROCEDURE — 1160F RVW MEDS BY RX/DR IN RCRD: CPT | Performed by: INTERNAL MEDICINE

## 2024-01-25 PROCEDURE — 80053 COMPREHEN METABOLIC PANEL: CPT | Performed by: INTERNAL MEDICINE

## 2024-01-25 PROCEDURE — 1157F ADVNC CARE PLAN IN RCRD: CPT | Performed by: INTERNAL MEDICINE

## 2024-01-25 PROCEDURE — 85025 COMPLETE CBC W/AUTO DIFF WBC: CPT | Performed by: INTERNAL MEDICINE

## 2024-01-25 PROCEDURE — 82728 ASSAY OF FERRITIN: CPT | Performed by: INTERNAL MEDICINE

## 2024-01-25 PROCEDURE — 82607 VITAMIN B-12: CPT | Mod: GEALAB | Performed by: INTERNAL MEDICINE

## 2024-01-25 PROCEDURE — 36415 COLL VENOUS BLD VENIPUNCTURE: CPT | Performed by: INTERNAL MEDICINE

## 2024-01-25 PROCEDURE — 1159F MED LIST DOCD IN RCRD: CPT | Performed by: INTERNAL MEDICINE

## 2024-01-25 ASSESSMENT — PATIENT HEALTH QUESTIONNAIRE - PHQ9
SUM OF ALL RESPONSES TO PHQ9 QUESTIONS 1 AND 2: 0
1. LITTLE INTEREST OR PLEASURE IN DOING THINGS: NOT AT ALL
2. FEELING DOWN, DEPRESSED OR HOPELESS: NOT AT ALL

## 2024-01-25 ASSESSMENT — COLUMBIA-SUICIDE SEVERITY RATING SCALE - C-SSRS
6. HAVE YOU EVER DONE ANYTHING, STARTED TO DO ANYTHING, OR PREPARED TO DO ANYTHING TO END YOUR LIFE?: NO
1. IN THE PAST MONTH, HAVE YOU WISHED YOU WERE DEAD OR WISHED YOU COULD GO TO SLEEP AND NOT WAKE UP?: NO
2. HAVE YOU ACTUALLY HAD ANY THOUGHTS OF KILLING YOURSELF?: NO

## 2024-01-25 ASSESSMENT — ENCOUNTER SYMPTOMS
OCCASIONAL FEELINGS OF UNSTEADINESS: 0
LOSS OF SENSATION IN FEET: 0
DEPRESSION: 0

## 2024-01-25 ASSESSMENT — PAIN SCALES - GENERAL: PAINLEVEL: 0-NO PAIN

## 2024-01-25 NOTE — PROGRESS NOTES
Patient ID: Nahum Steward is a 83 y.o. male.  Referring Physician: No referring provider defined for this encounter.  Primary Care Provider: Kim Dill PA-C      Subjective    HPI    Reason for visit: iron deficiency anemia      HPI      82 year old man with hx of HTN, CHF, CKD, hematuria, COPD, HL, GERD, BPH, CAD s/p CBAG in 2010 and mechanical aortic valve placement on  warfarin previously, embolic stroke in 2014, and GI bleeding in 2018, redo AVR in Jan 2019  taken off wafarin, who is being followed followed for iron deficiency anemia  he chronic microcytic anemia secondary to iron deficiency, and has been on oral iron, ferritin was 13 on 8/25/22 and Tsat was 84% while he is on iron so the iron was discontinued despite his ferritin  was still low, the iron was reintroduced in Dec 2022 when Hg as would be expected dropped to 9.7 with a ferritin of 13 and Tsat of 6%, this led to improvement of hg and MCV in 03/2023 to  13.1 g/dl and MCV of 85 respectively      Her history includes extensive GI work up for GI bleeding in 2018 that included EGD, colonoscopy, enteroscopy and capsule endoscopy,   11/8/2018 enteroscopy showed 4 bleeding angiodysplasia lesion in the stomach and vague non bleeding red spots/petechiae in the jujenum      he feels well now  has decent energy and is active,   appetite is good   BM soft but dark in color while on PO iron, which he takes once a day   he does not have abd pain, nausea or vomiting   tolerates the iron well   denies fever,   breathing significantly improved after the AVR surgery      1/25/24- interval history      he is feeling well   Eating well, Has gained weight over the holidays   he denies any new complaints   BP was elevated today with bradycardia   no SOB or chest pain  No dizziness   denies any overt bleeding   continues on PO iron every other day   tolerates well   denies nausea, vomiting , no constipation   no additional new complaints      PAST MEDICAL  "HISTORY:   HTN, CHF, CKD, hematuria, COPD, HL, GERD, BPH, CAD s/p CBAG in 2010 and mechanical aortic valve placement on warfarin, embolic stroke in 2014, and GI bleeding in 2018, redo AVR in Jan 2019      SOCIAL HISTORY:   former smoker   no alcohol   his daughter is an ER MD and other daughter is an ER nurse      FAMILY HISTORY:    sister had liver cancer   No other specific history of bleeding, clotting or malignant disorder in the family.     REVIEW OF SYSTEMS:  Pertinent finding as per the history above.  There are no additional specific symptoms pertaining to eyes, ENT, hematologic, lymphatic, neurological, psychiatric, cardiac,  pulmonary, GI, , endocrine, rheumatic, dermatological, or musculoskeletal systems.  All other systems have been reviewed and generally negative and noncontributory.     PHYSICAL EXAMINATION:    GENERAL:  Age-appropriate, in no acute discomfort.    VITAL SIGNS:  Reviewed in the EMR   HEENT:  Normocephalic and atraumatic.  Mucous membranes are moist. No oral lesions.    NECK:  Supple without lymphadenopathy.  No thyromegaly or bruits.    CHEST:  Clear to auscultation bilaterally.    HEART:  Regular , bradycardic, systolic murmur   ABDOMEN:  Soft, nontender, and nondistended. No hepatosplenomegaly.    EXTREMITIES:  No cyanosis, clubbing, mild bilateral edema   NEUROLOGICAL:  Alert, awake, and oriented.  No gross focal deficit.  LYMPHATICS: No significant lymphadenopathy.     LAB DATA:  Latest labs were reviewed in the EMR and from the outside sources.       Review of Systems - Oncology       Objective   BSA: 2.06 meters squared  /52 (BP Location: Left arm, Patient Position: Sitting, BP Cuff Size: Adult)   Pulse (!) 41   Temp 36.5 °C (97.7 °F) (Temporal)   Resp 18   Ht (S) 1.7 m (5' 6.93\")   Wt 89.5 kg (197 lb 6.8 oz)   SpO2 96%   BMI 30.99 kg/m²     Family History   Problem Relation Name Age of Onset    Liver cancer Mother      Alzheimer's disease Sister      Heart attack " Brother       Oncology History    No history exists.       Nahum Steward  reports that he quit smoking about 41 years ago. His smoking use included cigarettes. He started smoking about 64 years ago. He smoked an average of 2 packs per day. He has never used smokeless tobacco.  He  reports no history of alcohol use.  He  reports no history of drug use.    Results for orders placed or performed in visit on 01/25/24 (from the past 24 hour(s))   Ferritin   Result Value Ref Range    Ferritin 59 20 - 300 ng/mL   Iron and TIBC   Result Value Ref Range    Iron 160 (H) 35 - 150 ug/dL    UIBC 155 110 - 370 ug/dL    TIBC 315 240 - 445 ug/dL    % Saturation 51 (H) 25 - 45 %   Comprehensive Metabolic Panel   Result Value Ref Range    Glucose 116 (H) 74 - 99 mg/dL    Sodium 142 136 - 145 mmol/L    Potassium 3.9 3.5 - 5.3 mmol/L    Chloride 103 98 - 107 mmol/L    Bicarbonate 27 21 - 32 mmol/L    Anion Gap 16 10 - 20 mmol/L    Urea Nitrogen 38 (H) 6 - 23 mg/dL    Creatinine 1.88 (H) 0.50 - 1.30 mg/dL    eGFR 35 (L) >60 mL/min/1.73m*2    Calcium 8.8 8.6 - 10.3 mg/dL    Albumin 4.1 3.4 - 5.0 g/dL    Alkaline Phosphatase 50 33 - 136 U/L    Total Protein 6.7 6.4 - 8.2 g/dL    AST 12 9 - 39 U/L    Bilirubin, Total 0.5 0.0 - 1.2 mg/dL    ALT 10 10 - 52 U/L   CBC and Auto Differential   Result Value Ref Range    WBC 6.1 4.4 - 11.3 x10*3/uL    RBC 4.82 4.50 - 5.90 x10*6/uL    Hemoglobin 13.8 13.5 - 17.5 g/dL    Hematocrit 42.0 41.0 - 52.0 %    MCV 87 80 - 100 fL    MCH 28.6 26.0 - 34.0 pg    MCHC 32.9 32.0 - 36.0 g/dL    RDW 12.6 11.5 - 14.5 %    Platelets 150 150 - 450 x10*3/uL    Neutrophils % 70.5 40.0 - 80.0 %    Immature Granulocytes %, Automated 0.0 0.0 - 0.9 %    Lymphocytes % 15.8 13.0 - 44.0 %    Monocytes % 11.4 2.0 - 10.0 %    Eosinophils % 2.0 0.0 - 6.0 %    Basophils % 0.3 0.0 - 2.0 %    Neutrophils Absolute 4.27 1.60 - 5.50 x10*3/uL    Immature Granulocytes Absolute, Automated 0.00 0.00 - 0.50 x10*3/uL    Lymphocytes  Absolute 0.96 0.80 - 3.00 x10*3/uL    Monocytes Absolute 0.69 0.05 - 0.80 x10*3/uL    Eosinophils Absolute 0.12 0.00 - 0.40 x10*3/uL    Basophils Absolute 0.02 0.00 - 0.10 x10*3/uL         Performance Status:  Symptomatic; fully ambulatory    Assessment/Plan    1. Anemia      Iron deficiency anemia   history of massive GI bleeding in 2018 while on warfarin with gastric AVMs   more recently had significant hematuria from BPH   both are resolved   but it seems that his iron deficiency was not really adequately replaced   he is now consistently on iron since 2 months and his anemia has improved, remains iron deficient however although the ferritin is moving in the right direction      we discussed continued PO iron replacement vs IV iron, since he is tolerating PO iron well and there is improvement of Hg, will give an additional 2-3 months of PO iron, if no response then consider IV iron and referral to GI again      obtain additional labs for b12, folic acid and SPEP   he may have additional anemia from CKD but will have first to correct the iron deficiency      6/22/23- he has improvement of his hg almost to normal now and iron parameters continue to improve as well   the plan at this time is to continue PO iron replacement with oral iron every other day   no recent clinical events     9/21/23 - he is in a stable clinical condition, no new complaints   his Hg is stable and the iron parameters continue to improve, mild persistent anemia which could be related to ckd , continue PO iron     1/25/24- doing very well, Hg normal today, ferritin stable overall   Continue on PO iron as he is doing      2. Hx of mechanical AVR      had redo with bioprosthetic valve replacement in 2019 following his massive GI bleeding   off coumadin now    follows cardiology and carvedilol was discontinued recently due to bradycardia     3. Bradycardia   Marked bradycardia today   Not symptomatic, asked him to contact his cardiologist for  further instructions      RTC 4 m with labs             Jonh Huitron MD

## 2024-01-25 NOTE — PATIENT INSTRUCTIONS
Today you met with your hematologist/oncologist.  Recent labs were discussed and questions answered.  Dr. Huitron would like to see you again in 4 months with labs beforehand. Scheduling orders were placed to reflect this. Please don't hesitate to call our office should you have any further questions or concerns. Thank you.

## 2024-01-26 LAB — VIT B12 SERPL-MCNC: 563 PG/ML (ref 211–911)

## 2024-02-03 DIAGNOSIS — E78.00 HYPERCHOLESTEROLEMIA: ICD-10-CM

## 2024-02-04 DIAGNOSIS — R09.82 POSTNASAL DRIP: ICD-10-CM

## 2024-02-05 RX ORDER — FLUTICASONE PROPIONATE 50 MCG
SPRAY, SUSPENSION (ML) NASAL
Qty: 16 G | Refills: 0 | Status: SHIPPED | OUTPATIENT
Start: 2024-02-05 | End: 2024-03-06

## 2024-02-05 RX ORDER — ATORVASTATIN CALCIUM 10 MG/1
10 TABLET, FILM COATED ORAL DAILY
Qty: 90 TABLET | Refills: 0 | Status: SHIPPED | OUTPATIENT
Start: 2024-02-05 | End: 2024-05-01

## 2024-02-16 ENCOUNTER — LAB (OUTPATIENT)
Dept: LAB | Facility: LAB | Age: 84
End: 2024-02-16
Payer: MEDICARE

## 2024-02-16 DIAGNOSIS — R80.0 ISOLATED PROTEINURIA: ICD-10-CM

## 2024-02-16 DIAGNOSIS — I10 ESSENTIAL (PRIMARY) HYPERTENSION: ICD-10-CM

## 2024-02-16 DIAGNOSIS — E78.49 OTHER HYPERLIPIDEMIA: ICD-10-CM

## 2024-02-16 DIAGNOSIS — I25.810 CAD OF AUTOLOGOUS BYPASS GRAFT: ICD-10-CM

## 2024-02-16 DIAGNOSIS — E55.9 VITAMIN D DEFICIENCY, UNSPECIFIED: ICD-10-CM

## 2024-02-16 DIAGNOSIS — N18.30 CHRONIC KIDNEY DISEASE, STAGE 3 UNSPECIFIED (MULTI): Primary | ICD-10-CM

## 2024-02-16 LAB
25(OH)D3 SERPL-MCNC: 50 NG/ML (ref 30–100)
ALBUMIN SERPL BCP-MCNC: 4.2 G/DL (ref 3.4–5)
ANION GAP SERPL CALC-SCNC: 11 MMOL/L (ref 10–20)
APPEARANCE UR: ABNORMAL
BILIRUB UR STRIP.AUTO-MCNC: NEGATIVE MG/DL
BUN SERPL-MCNC: 38 MG/DL (ref 6–23)
CALCIUM SERPL-MCNC: 9.2 MG/DL (ref 8.6–10.3)
CHLORIDE SERPL-SCNC: 102 MMOL/L (ref 98–107)
CO2 SERPL-SCNC: 32 MMOL/L (ref 21–32)
COLOR UR: YELLOW
CREAT SERPL-MCNC: 1.89 MG/DL (ref 0.5–1.3)
CREAT UR-MCNC: 81.4 MG/DL (ref 20–370)
CREAT UR-MCNC: 84.9 MG/DL (ref 20–370)
EGFRCR SERPLBLD CKD-EPI 2021: 35 ML/MIN/1.73M*2
ERYTHROCYTE [DISTWIDTH] IN BLOOD BY AUTOMATED COUNT: 12.5 % (ref 11.5–14.5)
GLUCOSE SERPL-MCNC: 93 MG/DL (ref 74–99)
GLUCOSE UR STRIP.AUTO-MCNC: NEGATIVE MG/DL
HCT VFR BLD AUTO: 44 % (ref 41–52)
HGB BLD-MCNC: 14 G/DL (ref 13.5–17.5)
HYALINE CASTS #/AREA URNS AUTO: ABNORMAL /LPF
KETONES UR STRIP.AUTO-MCNC: NEGATIVE MG/DL
LEUKOCYTE ESTERASE UR QL STRIP.AUTO: ABNORMAL
MCH RBC QN AUTO: 28.7 PG (ref 26–34)
MCHC RBC AUTO-ENTMCNC: 31.8 G/DL (ref 32–36)
MCV RBC AUTO: 90 FL (ref 80–100)
MICROALBUMIN UR-MCNC: 64.2 MG/L
MICROALBUMIN/CREAT UR: 75.6 UG/MG CREAT
MUCOUS THREADS #/AREA URNS AUTO: ABNORMAL /LPF
NITRITE UR QL STRIP.AUTO: NEGATIVE
NRBC BLD-RTO: 0 /100 WBCS (ref 0–0)
PH UR STRIP.AUTO: 6 [PH]
PHOSPHATE SERPL-MCNC: 3.5 MG/DL (ref 2.5–4.9)
PLATELET # BLD AUTO: 171 X10*3/UL (ref 150–450)
POTASSIUM SERPL-SCNC: 3.8 MMOL/L (ref 3.5–5.3)
PROT UR STRIP.AUTO-MCNC: NEGATIVE MG/DL
PROT UR-ACNC: 23 MG/DL (ref 5–25)
PROT/CREAT UR: 0.28 MG/MG CREAT (ref 0–0.17)
PTH-INTACT SERPL-MCNC: 53.7 PG/ML (ref 18.5–88)
RBC # BLD AUTO: 4.87 X10*6/UL (ref 4.5–5.9)
RBC # UR STRIP.AUTO: ABNORMAL /UL
RBC #/AREA URNS AUTO: >20 /HPF
SODIUM SERPL-SCNC: 141 MMOL/L (ref 136–145)
SP GR UR STRIP.AUTO: 1.01
UROBILINOGEN UR STRIP.AUTO-MCNC: <2 MG/DL
WBC # BLD AUTO: 5.6 X10*3/UL (ref 4.4–11.3)
WBC #/AREA URNS AUTO: >50 /HPF
WBC CLUMPS #/AREA URNS AUTO: ABNORMAL /HPF
YEAST BUDDING #/AREA UR COMP ASSIST: PRESENT /HPF

## 2024-02-16 PROCEDURE — 84156 ASSAY OF PROTEIN URINE: CPT

## 2024-02-16 PROCEDURE — 82306 VITAMIN D 25 HYDROXY: CPT

## 2024-02-16 PROCEDURE — 80069 RENAL FUNCTION PANEL: CPT

## 2024-02-16 PROCEDURE — 85027 COMPLETE CBC AUTOMATED: CPT

## 2024-02-16 PROCEDURE — 81001 URINALYSIS AUTO W/SCOPE: CPT

## 2024-02-16 PROCEDURE — 83970 ASSAY OF PARATHORMONE: CPT

## 2024-02-16 PROCEDURE — 82570 ASSAY OF URINE CREATININE: CPT

## 2024-02-16 PROCEDURE — 82043 UR ALBUMIN QUANTITATIVE: CPT

## 2024-02-16 PROCEDURE — 36415 COLL VENOUS BLD VENIPUNCTURE: CPT

## 2024-02-17 DIAGNOSIS — N13.8 BPH WITH URINARY OBSTRUCTION: ICD-10-CM

## 2024-02-17 DIAGNOSIS — N40.1 BPH WITH URINARY OBSTRUCTION: ICD-10-CM

## 2024-02-19 RX ORDER — FINASTERIDE 5 MG/1
5 TABLET, FILM COATED ORAL DAILY
Qty: 90 TABLET | Refills: 0 | Status: SHIPPED | OUTPATIENT
Start: 2024-02-19 | End: 2024-05-19

## 2024-02-27 ENCOUNTER — OFFICE VISIT (OUTPATIENT)
Dept: CARDIOLOGY | Facility: HOSPITAL | Age: 84
End: 2024-02-27
Payer: MEDICARE

## 2024-02-27 VITALS
BODY MASS INDEX: 30.07 KG/M2 | OXYGEN SATURATION: 96 % | HEART RATE: 37 BPM | DIASTOLIC BLOOD PRESSURE: 63 MMHG | SYSTOLIC BLOOD PRESSURE: 152 MMHG | WEIGHT: 191.58 LBS

## 2024-02-27 DIAGNOSIS — I49.5 SYNCOPE DUE TO SICK SINUS SYNDROME (MULTI): ICD-10-CM

## 2024-02-27 DIAGNOSIS — R00.1 SYMPTOMATIC BRADYCARDIA: Primary | ICD-10-CM

## 2024-02-27 DIAGNOSIS — R55 SYNCOPE DUE TO SICK SINUS SYNDROME (MULTI): ICD-10-CM

## 2024-02-27 DIAGNOSIS — I10 BENIGN ESSENTIAL HYPERTENSION: ICD-10-CM

## 2024-02-27 PROCEDURE — 99215 OFFICE O/P EST HI 40 MIN: CPT | Performed by: NURSE PRACTITIONER

## 2024-02-27 PROCEDURE — 1160F RVW MEDS BY RX/DR IN RCRD: CPT | Performed by: NURSE PRACTITIONER

## 2024-02-27 PROCEDURE — 3078F DIAST BP <80 MM HG: CPT | Performed by: NURSE PRACTITIONER

## 2024-02-27 PROCEDURE — 93005 ELECTROCARDIOGRAM TRACING: CPT | Performed by: NURSE PRACTITIONER

## 2024-02-27 PROCEDURE — 1157F ADVNC CARE PLAN IN RCRD: CPT | Performed by: NURSE PRACTITIONER

## 2024-02-27 PROCEDURE — 3077F SYST BP >= 140 MM HG: CPT | Performed by: NURSE PRACTITIONER

## 2024-02-27 PROCEDURE — 1159F MED LIST DOCD IN RCRD: CPT | Performed by: NURSE PRACTITIONER

## 2024-02-27 PROCEDURE — 93010 ELECTROCARDIOGRAM REPORT: CPT | Performed by: INTERNAL MEDICINE

## 2024-02-27 PROCEDURE — 1126F AMNT PAIN NOTED NONE PRSNT: CPT | Performed by: NURSE PRACTITIONER

## 2024-02-27 PROCEDURE — 1036F TOBACCO NON-USER: CPT | Performed by: NURSE PRACTITIONER

## 2024-02-27 NOTE — PROGRESS NOTES
Chief Complaint:   Symptomatic bradycardia      History Of Present Illness:    Nahum Steward is a 83 y.o. male with history of CAD, CHF, GERD, HTN, dyslipidemia, CABG x 3? SVG to RCA. (2000), original mechanical AVR (2010) on Coumadin with multiple admissions in 2018 for GI bleeding requiring multiple blood products complicated by APRYL. Patient underwent redo AVR on 1/8/2019 with MagnaEase valve presenting for follow up.  Was evaluated by hematology recently and noted to have HR in the 30's at visit.  He was advised to follow up with Cardiology.  Nahum does report that he has been feeling progressively fatigued as well as short of breath with exertion of the past 6 months.  Denies chest pain or pressure.  Denies syncope or near-syncope.      Last Recorded Vitals:  Vitals:    02/27/24 0816   BP: 152/63   Pulse: (!) 37   SpO2: 96%   Weight: 86.9 kg (191 lb 9.3 oz)       Past Medical History:  He has no past medical history on file.    Past Surgical History:  He has a past surgical history that includes Colonoscopy (10/02/2018); Prostate surgery (10/02/2018); Coronary artery bypass graft (02/18/2019); Aortic valve replacement (02/18/2019); Other surgical history (02/18/2019); CT angio abdomen pelvis w and or wo IV IV contrast (11/26/2018); IR angiogram inferior epigastric (11/29/2018); IR angiogram inferior epigastric pelvic (11/29/2018); IR angiogram inferior epigastric pelvic (11/29/2018); IR embolization lymph node (Bilateral, 11/29/2018); IR angiogram inferior epigastric pelvic (11/29/2018); and CT angio abdomen pelvis w and or wo IV IV contrast (11/12/2018).      Social History:  He reports that he quit smoking about 41 years ago. His smoking use included cigarettes. He started smoking about 64 years ago. He smoked an average of 2 packs per day. He has never used smokeless tobacco. He reports that he does not drink alcohol and does not use drugs.    Family History:  Family History   Problem Relation Name Age of  Onset    Liver cancer Mother      Alzheimer's disease Sister      Heart attack Brother          Allergies:  Bee venom protein (honey bee) and Doxepin    Outpatient Medications:  Current Outpatient Medications   Medication Instructions    acetaminophen (Tylenol) 325 mg tablet oral, Every 6 hours PRN    ascorbic acid (Vitamin C) 500 mg tablet oral, Every 24 hours    aspirin 81 mg EC tablet 1 tablet, oral, Daily    atorvastatin (LIPITOR) 10 mg, oral, Daily    cholecalciferol (Vitamin D-3) 50 mcg (2,000 unit) capsule 1 capsule, oral, Daily    famotidine (Pepcid) 20 mg tablet 1 tablet, oral, 2 times daily    ferrous sulfate 325 (65 Fe) MG tablet 1 tablet, oral, 2 times daily    finasteride (PROSCAR) 5 mg, oral, Daily    fish,bora,flax oils-om3,6,9no1 (Omega 3-6-9) 1,200 mg capsule oral    fluticasone (Flonase) 50 mcg/actuation nasal spray USE 2 SPRAY(S) IN EACH NOSTRIL ONCE DAILY AT BEDTIME    isosorbide mononitrate ER (Imdur) 30 mg 24 hr tablet 1 tablet, oral, Daily    losartan (Cozaar) 25 mg tablet 1 tablet, oral, Daily    MULTIVIT 33-MTFOLATE-NAC-CHROM ORAL oral    omega-3 acid ethyl esters (Lovaza) 1 gram capsule 1 capsule, oral, Daily    ramelteon (ROZEREM) 8 mg, oral, Nightly PRN    tamsulosin (Flomax) 0.4 mg 24 hr capsule Take 1 capsule by mouth once daily    torsemide (DEMADEX) 20 mg, oral, Daily       Physical Exam:  Constitutional: Pleasant, Awake/Alert/Oriented to person place and time. No distress  Head: Atraumatic, Normocephalic  Eyes: EOMI. CHIDI  Neck:  No JVD  Cardiovascular: dav, S1, S2. No extra heart sounds or murmurs  Respiratory: Clear to auscultation bilaterally. No wheezing, rales or rhonchi. Good chest wall expansion  Abdomen: Soft, Nontender. Bowel sounds appreciated  Musculoskeletal: ROM intact. Muscle strength grossly intact upper and lower extremities 5/5.   Neurological: CNII-XII intact. Sensation grossly intact  Extremities: Warm and dry. No acute rashes and lesions  Psychiatric:  Appropriate mood and affect       Last Labs:  CBC -  Lab Results   Component Value Date    WBC 5.6 2024    HGB 14.0 2024    HCT 44.0 2024    MCV 90 2024     2024       CMP -  Lab Results   Component Value Date    CALCIUM 9.2 2024    PHOS 3.5 2024    PROT 6.7 2024    ALBUMIN 4.2 2024    AST 12 2024    ALT 10 2024    ALKPHOS 50 2024    BILITOT 0.5 2024       LIPID PANEL -   Lab Results   Component Value Date    CHOL 96 2021    TRIG 162 (H) 2021    HDL 23.0 (A) 2021    CHHDL 4.2 2021    LDLF 41 2021    VLDL 32 2021    NHDL 102 2020       RENAL FUNCTION PANEL -   Lab Results   Component Value Date    GLUCOSE 93 2024     2024    K 3.8 2024     2024    CO2 32 2024    ANIONGAP 11 2024    BUN 38 (H) 2024    CREATININE 1.89 (H) 2024    GFRMALE CANCELED 2023    CALCIUM 9.2 2024    PHOS 3.5 2024    ALBUMIN 4.2 2024        Lab Results   Component Value Date    BNP 96 2020    HGBA1C 6.3 2020       Last Cardiology Tests:  EC:1 AVB, HR 35bpm, bifascicular block     Echo 2020:  1. The left ventricular systolic function is normal with a 55-60% estimated  ejection fraction.  2. Spectral Doppler shows an impaired relaxation pattern of left ventricular  diastolic filling.  3. There is a bioprosthetic aortic valve. Echo findings are consistent with  normal aortic valve prosthesis structure and function.  4. There is mild mitral and tricuspid regurgitation.     Stress test 2/15/2019   1. The blunted heart rate diminishes the sensitivity of this test.   2. Wenckebach (Mobitz 1 - 2nd degree AV block) during recovery.     Intraoperative ALBERTO 2019   1. The left ventricular systolic function is normal.   2. Moderately increased left ventricular septal thickness.   3. The right atrium is severely dilated.   4.  No left atrial thrombus.   5. There is no evidence of a patent foramen ovale.     Cath 12/31/18:   1. Left main: no significant angiographic disease.   2. LAD: 30% ostial stenosis, 50-60% mid-vessel stenosis.   3. LCx: mild luminal irregularities.   4. RCA: small nondominant vessel, 95% diffuse mid-vessel disease.   5. 0/2 patent saphenous vein grafts, LIMA injected: not utilized on prior  bypass.     Lab review: I have personally reviewed the laboratory result(s)   Diagnostic review: I have personally reviewed the result(s) of the Echocardiogram, [facility]     Assessment/Plan   Very pleasant 83 y.o. male with history of CAD, CHF, GERD, HTN, dyslipidemia, CABG x 3? SVG to RCA. (2000), original mechanical AVR (2010) on Coumadin with multiple admissions in 2018 for GI bleeding requiring multiple blood products complicated by APRYL. Patient underwent redo AVR on 1/8/2019 with MagnaEase valve presenting for evaluation of sick sinus syndrome.  ECG today shows 2:1 AVB with bifascicular block, HR 35bpm.     Plan:  Discussed case with EP, Dr. Ramirez.  Recommend PPM implant in the setting of symptomatic bradycardia/sick sinus syndrome which will be arranged electively later this week.  Will obtain an echocardiogram for reassessment of EF prior to PPM implant.  He is not currently on any AVNB agents. BP elevated in office, but will assess after PPM implant.        Renee Bruno, APRN-CNP

## 2024-02-29 ENCOUNTER — HOSPITAL ENCOUNTER (OUTPATIENT)
Facility: HOSPITAL | Age: 84
Setting detail: OUTPATIENT SURGERY
Discharge: HOME | End: 2024-02-29
Attending: STUDENT IN AN ORGANIZED HEALTH CARE EDUCATION/TRAINING PROGRAM | Admitting: STUDENT IN AN ORGANIZED HEALTH CARE EDUCATION/TRAINING PROGRAM
Payer: MEDICARE

## 2024-02-29 ENCOUNTER — APPOINTMENT (OUTPATIENT)
Dept: RADIOLOGY | Facility: HOSPITAL | Age: 84
End: 2024-02-29
Payer: MEDICARE

## 2024-02-29 ENCOUNTER — HOSPITAL ENCOUNTER (OUTPATIENT)
Dept: CARDIOLOGY | Facility: HOSPITAL | Age: 84
Setting detail: OUTPATIENT SURGERY
Discharge: HOME | End: 2024-02-29
Payer: MEDICARE

## 2024-02-29 VITALS
SYSTOLIC BLOOD PRESSURE: 192 MMHG | OXYGEN SATURATION: 98 % | DIASTOLIC BLOOD PRESSURE: 68 MMHG | RESPIRATION RATE: 20 BRPM | HEART RATE: 60 BPM

## 2024-02-29 DIAGNOSIS — R55 SYNCOPE DUE TO SICK SINUS SYNDROME (MULTI): ICD-10-CM

## 2024-02-29 DIAGNOSIS — I49.5 SYNCOPE DUE TO SICK SINUS SYNDROME (MULTI): ICD-10-CM

## 2024-02-29 DIAGNOSIS — R00.1 SYMPTOMATIC BRADYCARDIA: ICD-10-CM

## 2024-02-29 LAB
AORTIC VALVE MEAN GRADIENT: 8 MMHG
AORTIC VALVE PEAK VELOCITY: 2.08 M/S
AV PEAK GRADIENT: 17.3 MMHG
AVA (PEAK VEL): 2.19 CM2
AVA (VTI): 2.91 CM2
EJECTION FRACTION APICAL 4 CHAMBER: 63.6
EJECTION FRACTION: 62 %
LEFT ATRIUM VOLUME AREA LENGTH INDEX BSA: 32.8 ML/M2
LEFT VENTRICLE INTERNAL DIMENSION DIASTOLE: 5.09 CM (ref 3.5–6)
LEFT VENTRICULAR OUTFLOW TRACT DIAMETER: 2 CM
MITRAL VALVE E/A RATIO: 0.75
MITRAL VALVE E/E' RATIO: 11.06
RIGHT VENTRICLE FREE WALL PEAK S': 12.1 CM/S
RIGHT VENTRICLE PEAK SYSTOLIC PRESSURE: 20.5 MMHG
TRICUSPID ANNULAR PLANE SYSTOLIC EXCURSION: 2 CM

## 2024-02-29 PROCEDURE — 2750000001 HC OR 275 NO HCPCS: Performed by: STUDENT IN AN ORGANIZED HEALTH CARE EDUCATION/TRAINING PROGRAM

## 2024-02-29 PROCEDURE — 7100000010 HC PHASE TWO TIME - EACH INCREMENTAL 1 MINUTE: Performed by: STUDENT IN AN ORGANIZED HEALTH CARE EDUCATION/TRAINING PROGRAM

## 2024-02-29 PROCEDURE — C1769 GUIDE WIRE: HCPCS | Performed by: STUDENT IN AN ORGANIZED HEALTH CARE EDUCATION/TRAINING PROGRAM

## 2024-02-29 PROCEDURE — C1900 LEAD, CORONARY VENOUS: HCPCS | Performed by: STUDENT IN AN ORGANIZED HEALTH CARE EDUCATION/TRAINING PROGRAM

## 2024-02-29 PROCEDURE — C1892 INTRO/SHEATH,FIXED,PEEL-AWAY: HCPCS | Performed by: STUDENT IN AN ORGANIZED HEALTH CARE EDUCATION/TRAINING PROGRAM

## 2024-02-29 PROCEDURE — 2720000007 HC OR 272 NO HCPCS: Performed by: STUDENT IN AN ORGANIZED HEALTH CARE EDUCATION/TRAINING PROGRAM

## 2024-02-29 PROCEDURE — 99152 MOD SED SAME PHYS/QHP 5/>YRS: CPT | Performed by: STUDENT IN AN ORGANIZED HEALTH CARE EDUCATION/TRAINING PROGRAM

## 2024-02-29 PROCEDURE — 93306 TTE W/DOPPLER COMPLETE: CPT

## 2024-02-29 PROCEDURE — 99153 MOD SED SAME PHYS/QHP EA: CPT | Performed by: STUDENT IN AN ORGANIZED HEALTH CARE EDUCATION/TRAINING PROGRAM

## 2024-02-29 PROCEDURE — 33208 INSRT HEART PM ATRIAL & VENT: CPT | Performed by: STUDENT IN AN ORGANIZED HEALTH CARE EDUCATION/TRAINING PROGRAM

## 2024-02-29 PROCEDURE — 71045 X-RAY EXAM CHEST 1 VIEW: CPT | Mod: FOREIGN READ | Performed by: RADIOLOGY

## 2024-02-29 PROCEDURE — 7100000009 HC PHASE TWO TIME - INITIAL BASE CHARGE: Performed by: STUDENT IN AN ORGANIZED HEALTH CARE EDUCATION/TRAINING PROGRAM

## 2024-02-29 PROCEDURE — 33208 INSRT HEART PM ATRIAL & VENT: CPT | Mod: KX | Performed by: STUDENT IN AN ORGANIZED HEALTH CARE EDUCATION/TRAINING PROGRAM

## 2024-02-29 PROCEDURE — 71045 X-RAY EXAM CHEST 1 VIEW: CPT

## 2024-02-29 PROCEDURE — 2500000004 HC RX 250 GENERAL PHARMACY W/ HCPCS (ALT 636 FOR OP/ED): Performed by: STUDENT IN AN ORGANIZED HEALTH CARE EDUCATION/TRAINING PROGRAM

## 2024-02-29 PROCEDURE — C1785 PMKR, DUAL, RATE-RESP: HCPCS | Performed by: STUDENT IN AN ORGANIZED HEALTH CARE EDUCATION/TRAINING PROGRAM

## 2024-02-29 PROCEDURE — 93306 TTE W/DOPPLER COMPLETE: CPT | Performed by: INTERNAL MEDICINE

## 2024-02-29 PROCEDURE — 2500000005 HC RX 250 GENERAL PHARMACY W/O HCPCS: Performed by: STUDENT IN AN ORGANIZED HEALTH CARE EDUCATION/TRAINING PROGRAM

## 2024-02-29 DEVICE — LEAD, PACING, INGEVITY PLUS, 59 CM: Type: IMPLANTABLE DEVICE | Site: HEART | Status: FUNCTIONAL

## 2024-02-29 DEVICE — PACEMAKER
Type: IMPLANTABLE DEVICE | Site: CHEST | Status: FUNCTIONAL
Brand: ACCOLADE™ MRI EL DR

## 2024-02-29 DEVICE — LEAD, PACING, INGEVITY PLUS, 52 CM: Type: IMPLANTABLE DEVICE | Site: HEART | Status: FUNCTIONAL

## 2024-02-29 RX ORDER — SODIUM CHLORIDE 9 MG/ML
INJECTION, SOLUTION INTRAVENOUS CONTINUOUS PRN
Status: COMPLETED | OUTPATIENT
Start: 2024-02-29 | End: 2024-02-29

## 2024-02-29 RX ORDER — BUPIVACAINE HYDROCHLORIDE 5 MG/ML
INJECTION, SOLUTION EPIDURAL; INTRACAUDAL AS NEEDED
Status: DISCONTINUED | OUTPATIENT
Start: 2024-02-29 | End: 2024-02-29 | Stop reason: HOSPADM

## 2024-02-29 RX ORDER — FENTANYL CITRATE 50 UG/ML
INJECTION, SOLUTION INTRAMUSCULAR; INTRAVENOUS AS NEEDED
Status: DISCONTINUED | OUTPATIENT
Start: 2024-02-29 | End: 2024-02-29 | Stop reason: HOSPADM

## 2024-02-29 RX ORDER — CEFAZOLIN SODIUM 2 G/100ML
INJECTION, SOLUTION INTRAVENOUS CONTINUOUS PRN
Status: COMPLETED | OUTPATIENT
Start: 2024-02-29 | End: 2024-02-29

## 2024-02-29 RX ORDER — MIDAZOLAM HYDROCHLORIDE 1 MG/ML
INJECTION, SOLUTION INTRAMUSCULAR; INTRAVENOUS AS NEEDED
Status: DISCONTINUED | OUTPATIENT
Start: 2024-02-29 | End: 2024-02-29 | Stop reason: HOSPADM

## 2024-02-29 RX ORDER — VANCOMYCIN HYDROCHLORIDE 1 G/20ML
INJECTION, POWDER, LYOPHILIZED, FOR SOLUTION INTRAVENOUS AS NEEDED
Status: DISCONTINUED | OUTPATIENT
Start: 2024-02-29 | End: 2024-02-29 | Stop reason: HOSPADM

## 2024-02-29 ASSESSMENT — PAIN - FUNCTIONAL ASSESSMENT
PAIN_FUNCTIONAL_ASSESSMENT: 0-10

## 2024-02-29 ASSESSMENT — PAIN SCALES - GENERAL
PAINLEVEL_OUTOF10: 0 - NO PAIN

## 2024-02-29 NOTE — DISCHARGE INSTRUCTIONS
Incision Care:   Keep your incision dry and covered for one week after implantation. Let the steri strips fall off on their own. After one week, you may wash the site with soap and water only. Inspect incision daily for increase in redness, swelling, or drainage. Notify provider if this occurs, or if you develop a fever.     Pain:  Tenderness and mild pain at the incision site is to be expected for the first few weeks. Use pain relievers such as Tylenol or Motrin for pain relief. If the pain does not get better or if it gets worse, please call your provider.     Activity:  For FOUR weeks after implantation, avoid large arm movements on the side of your implant. Do not raise or stretch that arm above shoulder level. Do not life items greater than 15 lbs. Avoid activities such as golf or swimming for 6 weeks. Try to use garments that button down the front and avoid pull over shirts.     ID Card:   It is important that you carry your pacemaker ID card with you at all times. Inform all healthcare providers that you have a pacemaker. Future medical procedures and dental work MAY require and antibiotic until the leads are fully healed in your heart. Read the patient information booklet for further details.

## 2024-03-01 ENCOUNTER — HOSPITAL ENCOUNTER (OUTPATIENT)
Dept: CARDIOLOGY | Facility: CLINIC | Age: 84
Discharge: HOME | End: 2024-03-01
Payer: MEDICARE

## 2024-03-01 DIAGNOSIS — I49.5 SYNCOPE DUE TO SICK SINUS SYNDROME (MULTI): Primary | ICD-10-CM

## 2024-03-01 DIAGNOSIS — I49.5 SYNCOPE DUE TO SICK SINUS SYNDROME (MULTI): ICD-10-CM

## 2024-03-01 DIAGNOSIS — R55 SYNCOPE DUE TO SICK SINUS SYNDROME (MULTI): Primary | ICD-10-CM

## 2024-03-01 DIAGNOSIS — R00.1 SYMPTOMATIC BRADYCARDIA: ICD-10-CM

## 2024-03-01 DIAGNOSIS — R55 SYNCOPE DUE TO SICK SINUS SYNDROME (MULTI): ICD-10-CM

## 2024-03-01 NOTE — SIGNIFICANT EVENT
Cath Lab Procedure Call Back  Procedure Date: 02/29/2024_______________   Procedure Performed:__PPM__________________  Physician:_DeOliveira______________  Spoke with:_____________________________  Date/Time Contacted: 1st attempt: _3/1/24 _12:25__ 2nd attempt: _________ ___:____  Phone#:_______________ Unable to reach/Left message:____Y________  Access Site: Pain______Bleeding______Bruised______Infection______WNL______  Dressing Removal Date:______________ Anticoagulation Post Intervention_________  Satisfied with Care:________________ D/C Instructions adequate_______________  Follow Up Appointment:_____________________ Length of Call:__________________  Comments:______________________________________________________________________________________________________________________________________________

## 2024-03-01 NOTE — POST-PROCEDURE NOTE
Physician Transition of Care Summary  Invasive Cardiovascular Lab    Procedure Date: 2/29/2024  Attending:    * Pascale Llamas - Primary  Resident/Fellow/Other Assistant: Surgeon(s) and Role:    Indications:   Pre-op Diagnosis     * Symptomatic bradycardia [R00.1]     * Syncope due to sick sinus syndrome (CMS/HCC) [R55, I49.5]    Post-procedure diagnosis:   Post-op Diagnosis     * Symptomatic bradycardia [R00.1]     * Syncope due to sick sinus syndrome (CMS/HCC) [R55, I49.5]    Procedure(s):     * PPM IMPLANT DUAL  MS INS NEW/RPLCMT PRM PM W/TRANSV ELTRD ATRIAL&VENT [53413]  MS MOD SED SAME PHYS/QHP INITIAL 15 MINS 5/> YRS [63359]  MS MOD SED SAME PHYS/QHP EACH ADDL 15 MINS [53251]    Procedure Findings:   See below    Description of the Procedure:   Dual chamber pacemaker implantation    Procedures:  Implant of dual chamber PPM ([id number])    Patient history:  Please refer to the detailed history and physical on the patient's medical chart.     Procedure narrative:  The patient was in the fasting state. A grounding pad was placed. The patient was set up for continuous monitoring of surface 12 lead ECG and pulse oximetry. Blood pressure was monitored. The procedure was performed under moderate sedation. The left upper chest was prepped and draped in the usual sterile fashion. Local anesthesia: After preoperative IV antibiotic was completely infused, subcutaneous tissues just medial to the left deltopectoral area, were infiltrated with bupivacaine for local anesthesia.   Using a scalpel, an incision was made, which was extended to the left prepectoral fascia using blunt dissection. A pulse generator pocket was created.  The Left cephalic vein was identified, the distal end was tied off, and using Underwood scissors, a venotomy was created. A wire (0.035 Glidewire) was advanced through the venotomy.   A 7 F sheath was placed over of the guidewires. The RV pacing lead was then advanced to the heart via fluoroscopic  guidance. The ventricle was mapped, and the lead was fixed to the right ventricular apex. After lead placement, appropriate sensing and thresholds were obtained. The sheath was peeled away.  A second 7 F sheath was advanced over the guidewire, and the dilator and guidewire were removed. Under fluoroscopic guidance, a pacing lead was advanced to the heart, and the atrium was mapped. The lead was fixed to the right atrial appendage. The sheath was peeled away.  The leads were sutured in place to the pectoralis muscle using 0 Tycron suture. Lead measurements were obtained.  A dual chamber pacemaker pulse generator was attached to the leads and implanted.  The device was interrogated and its parameters recorded; telemetered electrograms and pacing and sensing thresholds were measured.   The pocket was flushed with Vancomycin solution. The wound was closed in three layers using. a 0 Vicryl interrupted layer, followed by a 3.0 Vicryl  continuous layer, and a 4.0 Vicryl continuous layer for the subcuticular layer. Steri-Strips and a dressing were applied.    Final Implant Settings:      Lead Parameters  Atrial: 564ohms, P wave 3.4mV, threshold 0.6V@0.4msec  RVent: 816ohms, R wave 17mV, threshold 0.6V@0.4msec,    Summary:  Successful implantation of a [employer] dual chamber pacemaker.      Patient Instructions:  Please do not lift left arm above shoulder level for 4-5 weeks  No repetitive motion or lifting heavy weight for 4-5 weeks  No driving, alcohol or making legal decisions for 24 hours.  Keep wound completely dry for 7 days; may shower but keep bandage as dry as possible.  No soaking in hot tubs or baths for 10 days. May sponge bath  Keep bandage on incision for 1 week.  Allow steristrips underneath to fall off naturally.    Please call our office if you notice any discharge or swelling around incision or fever.    Follow up:   The patient should be alert for bleeding, swelling, or signs of infection. The  patient should call the electrophysiologist immediately if symptoms recur, or for any problems. The patient and family (with HIPPA consent)  have been instructed accordingly.   Follow up in Clinic for wound check as scheduled. Device clinic follow up will then be scheduled. Remote monitoring will be instituted if possible.     See complete procedural log and parameters.     Complications:   None    Stents/Implants:   Cardiovascular Implants       Pacemaker    Lead, Pacing, Ingevity Plus, 59 Cm - [id number] - [id number] - Implanted        Inventory item: LEAD, PACING, INGEVITY PLUS, 59 CM Model/Cat number: [id number]    Serial number: [id number] : [employer]    Lot number: [id number] Implant Date: 2/29/2024      As of 2/29/2024       Status: Implanted                      Lead, Pacing, Ingevity Plus, 52 Cm - [id number] - [id number] - Implanted        Inventory item: LEAD, PACING, INGEVITY PLUS, 52 CM Model/Cat number: [id number]    Serial number: [id number] : [employer]    Lot number: [id number] Implant Date: 2/29/2024      As of 2/29/2024       Status: Implanted                      Pacemaker, Accolade Mri Pulse, Dr Prescott - [id number] - [id number] - Implanted        Inventory item: PACEMAKER, ACCOLADE MRI PULSE, DR PRESCOTT Model/Cat number: L331    Serial number: [id number] : [employer]    Lot number: [id number] Device identifier: [id number]    Implant Date: 2/29/2024        As of 2/29/2024       Status: Implanted                      Other Cardiac Implant    Valve, Mitral 25mm Magna Ease Case [id number] - Implanted        Model/Cat number: [id number] Serial number: [id number]    : [employer]        As of 9/1/2023       Status: Unknown                                Estimated Blood Loss:   30 mL    Anesthesia: Moderate Sedation Anesthesia Staff: No anesthesia staff entered.    Any Specimen(s) Removed:   No specimens collected during this procedure.    Disposition:    Home      Electronically signed by: Pascale Ramirez MD, 2/29/2024 8:10 PM

## 2024-03-05 DIAGNOSIS — R09.82 POSTNASAL DRIP: ICD-10-CM

## 2024-03-06 RX ORDER — FLUTICASONE PROPIONATE 50 MCG
SPRAY, SUSPENSION (ML) NASAL
Qty: 16 G | Refills: 0 | Status: SHIPPED | OUTPATIENT
Start: 2024-03-06 | End: 2024-04-08

## 2024-03-07 NOTE — PROGRESS NOTES
Electrophysiology Follow up Visit    Nahum Steward is a 83 y.o. year old male patient with    1.  CAD: S/p CABG in 2000  2.  Valvular heart disease: S/p mechanical AVR 2010.  Patient was on Coumadin with multiple admissions in 2018 for GI bleeding requiring multiple blood products.  He underwent redo AVR in 2019 with Magna Ease valve  3.  Bradycardia: Patient was seen at hematology recently and was noted to have heart rates in the 30s.  He followed up with general cardiology and was found to be in 2-1 heart block.  He did report he was feeling progressively fatigued and short of breath with exertion over the past few months.  He was on no AV alfonso blocking agents.  He had echocardiogram in February 2024 which showed normal LV function.  Patient was agreeable to pacemaker implant.    2/29/2024 successful implant of [employer] dual-chamber pacemaker    Patient here for follow-up for symptomatic bradycardia s/p pacemaker implant. He reports he is feeling very well with significant improvement in energy levels. He has adhered to activity restrictions.     Medical History  He has a past medical history of CHF (congestive heart failure) (CMS/HCC), Coronary artery disease, Hyperlipidemia, and Hypertension.    Social History  He reports that he quit smoking about 41 years ago. His smoking use included cigarettes. He started smoking about 64 years ago. He smoked an average of 2 packs per day. He has never used smokeless tobacco. He reports that he does not drink alcohol and does not use drugs.      FamHx:   Family History   Problem Relation Name Age of Onset    Liver cancer Mother      Alzheimer's disease Sister      Heart attack Brother         Allergies   Allergen Reactions    Bee Venom Protein (Honey Bee) Swelling    Doxepin Unknown     Felt drunk        Outpatient Medications:  Current Outpatient Medications   Medication Instructions    acetaminophen (Tylenol) 325 mg tablet oral, Every 6 hours PRN    ascorbic  "acid (VITAMIN C) 1,000 mg, oral, Every 24 hours    aspirin 81 mg EC tablet 1 tablet, oral, Daily    atorvastatin (LIPITOR) 10 mg, oral, Daily    cholecalciferol (Vitamin D-3) 50 mcg (2,000 unit) capsule 1 capsule, oral, Daily    famotidine (Pepcid) 20 mg tablet 1 tablet, oral, Daily    ferrous sulfate 325 (65 Fe) MG tablet 1 tablet, oral, 2 times daily    finasteride (PROSCAR) 5 mg, oral, Daily    fish,bora,flax oils-om3,6,9no1 (Omega 3-6-9) 1,200 mg capsule oral    fluticasone (Flonase) 50 mcg/actuation nasal spray USE 2 SPRAY(S) IN EACH NOSTRIL ONCE DAILY AT BEDTIME    isosorbide mononitrate ER (Imdur) 30 mg 24 hr tablet 1 tablet, oral, Daily    losartan (Cozaar) 25 mg tablet 1 tablet, oral, Daily    magnesium oxide/magnesium (MAGNESIUM OXIDE-MG AA CHELATE ORAL) 1 tablet, oral, 2 times daily    MULTIVIT 33-MTFOLATE-NAC-CHROM ORAL oral    ramelteon (ROZEREM) 8 mg, oral, Nightly PRN    tamsulosin (Flomax) 0.4 mg 24 hr capsule Take 1 capsule by mouth once daily    torsemide (DEMADEX) 20 mg, oral, Daily         Last Recorded Vitals: .vital      2/29/2024     8:38 AM 2/29/2024    10:30 AM 2/29/2024    10:45 AM 2/29/2024    11:00 AM 2/29/2024    11:15 AM 2/29/2024    11:50 AM 3/12/2024     8:22 AM   Vitals   Systolic 198 188 184 167 192  134   Diastolic 79 59 84 83 68  81   Heart Rate 57 60 60 60 60 60 70   Resp 15 16 17 18 20     Height (in)       1.676 m (5' 6\")   Weight (lb)       190.7   BMI       30.78 kg/m2   BSA (m2)       2.01 m2   Visit Report       Report    Visit Vitals  /81   Pulse 70   Ht 1.676 m (5' 6\")   Wt 86.5 kg (190 lb 11.2 oz)   SpO2 94%   BMI 30.78 kg/m²   Smoking Status Former   BSA 2.01 m²        Physical Exam  Constitutional:       Appearance: Normal appearance.   Eyes:      Pupils: Pupils are equal, round, and reactive to light.   Cardiovascular:      Rate and Rhythm: Normal rate and regular rhythm.      Heart sounds: Normal heart sounds.   Pulmonary:      Effort: Pulmonary effort is " normal.      Breath sounds: Normal breath sounds.   Musculoskeletal:         General: Normal range of motion.   Skin:     General: Skin is warm and dry.   Neurological:      Mental Status: He is alert and oriented to person, place, and time.      Cardiac Testing Reviewed Study(s):  Echo(February /2024):   CONCLUSIONS:   1. Left ventricular systolic function is normal with a 60-65% estimated ejection fraction.   2. Spectral Doppler shows an impaired relaxation pattern of left ventricular diastolic filling.   3. There is a bioprosthetic aortic valve.   4. There is trace-mild mitral, tricuspid and pulmonic regurgitation.  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 Patient has significant improvement in his energy levels. ECG shows sinus rhythm with ventricular pacing. Remote device check showed pacemaker functioning properly with no VT/VF or AT/AF. Left chest incision is well approximated with no hematoma, erythema, or drainage. We reviewed activity restrictions that will continue for the next few weeks. He has an appointment scheduled with device clinic in May. We will follow patient through device clinic.    PLAN  Continue activity restrictions for the next few weeks  We will follow patient through device clinic  Continue to follow with general cardiology as scheduled      Exclusive of any other services or procedures performed, Winnie PISANO APRN-CNP , spent 30 minutes in duration for this visit today.  This time consisted of chart  review, obtaining history, and/or performing the exam as documented above as well as documenting the clinical information for the encounter in the electronic record, discussing treatment options, plans, and/or goals with patient, family, and/or caregiver, refilling medications, updating the electronic record, ordering medicines, lab work, imaging, referrals, and/or procedures as documented above and communicating with other [facility] professionals. I have discussed the results of laboratory, radiology, and cardiology studies with the patient and their family/caregiver.

## 2024-03-10 LAB
ATRIAL RATE: 69 BPM
P AXIS: -3 DEGREES
P OFFSET: 174 MS
P ONSET: 109 MS
PR INTERVAL: 230 MS
Q ONSET: 224 MS
QRS COUNT: 5 BEATS
QRS DURATION: 176 MS
QT INTERVAL: 502 MS
QTC CALCULATION(BAZETT): 383 MS
QTC FREDERICIA: 419 MS
R AXIS: -68 DEGREES
T AXIS: -7 DEGREES
T OFFSET: 475 MS
VENTRICULAR RATE: 35 BPM

## 2024-03-12 ENCOUNTER — OFFICE VISIT (OUTPATIENT)
Dept: CARDIOLOGY | Facility: HOSPITAL | Age: 84
End: 2024-03-12
Payer: MEDICARE

## 2024-03-12 VITALS
DIASTOLIC BLOOD PRESSURE: 81 MMHG | HEIGHT: 66 IN | BODY MASS INDEX: 30.65 KG/M2 | WEIGHT: 190.7 LBS | OXYGEN SATURATION: 94 % | HEART RATE: 70 BPM | SYSTOLIC BLOOD PRESSURE: 134 MMHG

## 2024-03-12 DIAGNOSIS — Z95.0 PRESENCE OF CARDIAC PACEMAKER: Primary | ICD-10-CM

## 2024-03-12 DIAGNOSIS — R00.1 SYMPTOMATIC BRADYCARDIA: ICD-10-CM

## 2024-03-12 PROCEDURE — 93010 ELECTROCARDIOGRAM REPORT: CPT | Performed by: STUDENT IN AN ORGANIZED HEALTH CARE EDUCATION/TRAINING PROGRAM

## 2024-03-12 PROCEDURE — 1126F AMNT PAIN NOTED NONE PRSNT: CPT | Performed by: NURSE PRACTITIONER

## 2024-03-12 PROCEDURE — 3079F DIAST BP 80-89 MM HG: CPT | Performed by: NURSE PRACTITIONER

## 2024-03-12 PROCEDURE — 93005 ELECTROCARDIOGRAM TRACING: CPT | Performed by: NURSE PRACTITIONER

## 2024-03-12 PROCEDURE — 1157F ADVNC CARE PLAN IN RCRD: CPT | Performed by: NURSE PRACTITIONER

## 2024-03-12 PROCEDURE — 99213 OFFICE O/P EST LOW 20 MIN: CPT | Performed by: NURSE PRACTITIONER

## 2024-03-12 PROCEDURE — 1036F TOBACCO NON-USER: CPT | Performed by: NURSE PRACTITIONER

## 2024-03-12 PROCEDURE — 3075F SYST BP GE 130 - 139MM HG: CPT | Performed by: NURSE PRACTITIONER

## 2024-03-12 PROCEDURE — 1160F RVW MEDS BY RX/DR IN RCRD: CPT | Performed by: NURSE PRACTITIONER

## 2024-03-12 PROCEDURE — 1159F MED LIST DOCD IN RCRD: CPT | Performed by: NURSE PRACTITIONER

## 2024-03-12 PROCEDURE — 99024 POSTOP FOLLOW-UP VISIT: CPT | Performed by: NURSE PRACTITIONER

## 2024-04-03 DIAGNOSIS — F51.01 PRIMARY INSOMNIA: ICD-10-CM

## 2024-04-03 RX ORDER — RAMELTEON 8 MG/1
8 TABLET ORAL NIGHTLY PRN
Qty: 90 TABLET | Refills: 0 | Status: SHIPPED | OUTPATIENT
Start: 2024-04-03

## 2024-04-05 DIAGNOSIS — R09.82 POSTNASAL DRIP: ICD-10-CM

## 2024-04-05 LAB
ATRIAL RATE: 70 BPM
P AXIS: 73 DEGREES
P OFFSET: 140 MS
P ONSET: 89 MS
PR INTERVAL: 190 MS
Q ONSET: 184 MS
QRS COUNT: 11 BEATS
QRS DURATION: 184 MS
QT INTERVAL: 472 MS
QTC CALCULATION(BAZETT): 509 MS
QTC FREDERICIA: 497 MS
R AXIS: 254 DEGREES
T AXIS: 63 DEGREES
T OFFSET: 420 MS
VENTRICULAR RATE: 70 BPM

## 2024-04-08 DIAGNOSIS — R09.82 POSTNASAL DRIP: ICD-10-CM

## 2024-04-08 RX ORDER — FLUTICASONE PROPIONATE 50 MCG
SPRAY, SUSPENSION (ML) NASAL
Qty: 16 G | Refills: 0 | Status: SHIPPED | OUTPATIENT
Start: 2024-04-08 | End: 2024-04-08 | Stop reason: SDUPTHER

## 2024-04-08 RX ORDER — FLUTICASONE PROPIONATE 50 MCG
SPRAY, SUSPENSION (ML) NASAL
Qty: 16 G | Refills: 2 | Status: SHIPPED | OUTPATIENT
Start: 2024-04-08

## 2024-04-20 DIAGNOSIS — E55.9 VITAMIN D DEFICIENCY: Primary | ICD-10-CM

## 2024-04-22 DIAGNOSIS — E55.9 VITAMIN D DEFICIENCY: ICD-10-CM

## 2024-04-22 RX ORDER — ACETAMINOPHEN 500 MG
2000 TABLET ORAL DAILY
Qty: 90 CAPSULE | Refills: 1 | Status: SHIPPED | OUTPATIENT
Start: 2024-04-22

## 2024-04-22 RX ORDER — NICOTINE 11MG/24HR
PATCH, TRANSDERMAL 24 HOURS TRANSDERMAL DAILY
Qty: 90 CAPSULE | Refills: 0 | Status: SHIPPED | OUTPATIENT
Start: 2024-04-22 | End: 2024-04-22 | Stop reason: SDUPTHER

## 2024-05-01 DIAGNOSIS — E78.00 HYPERCHOLESTEROLEMIA: ICD-10-CM

## 2024-05-01 RX ORDER — ATORVASTATIN CALCIUM 10 MG/1
10 TABLET, FILM COATED ORAL DAILY
Qty: 90 TABLET | Refills: 0 | Status: SHIPPED | OUTPATIENT
Start: 2024-05-01

## 2024-05-17 DIAGNOSIS — N40.1 BPH WITH URINARY OBSTRUCTION: ICD-10-CM

## 2024-05-17 DIAGNOSIS — N13.8 BPH WITH URINARY OBSTRUCTION: ICD-10-CM

## 2024-05-19 RX ORDER — FINASTERIDE 5 MG/1
5 TABLET, FILM COATED ORAL DAILY
Qty: 90 TABLET | Refills: 0 | Status: SHIPPED | OUTPATIENT
Start: 2024-05-19

## 2024-05-23 DIAGNOSIS — R00.1 BRADYCARDIA ON ECG: ICD-10-CM

## 2024-05-23 DIAGNOSIS — Z95.0 PACEMAKER: Primary | ICD-10-CM

## 2024-05-28 ENCOUNTER — HOSPITAL ENCOUNTER (OUTPATIENT)
Dept: CARDIOLOGY | Facility: HOSPITAL | Age: 84
Discharge: HOME | End: 2024-05-28
Payer: MEDICARE

## 2024-05-28 DIAGNOSIS — R55 SYNCOPE DUE TO SICK SINUS SYNDROME (MULTI): ICD-10-CM

## 2024-05-28 DIAGNOSIS — I49.5 SYNCOPE DUE TO SICK SINUS SYNDROME (MULTI): ICD-10-CM

## 2024-05-28 DIAGNOSIS — R00.1 SYMPTOMATIC BRADYCARDIA: ICD-10-CM

## 2024-05-28 PROCEDURE — 93280 PM DEVICE PROGR EVAL DUAL: CPT

## 2024-05-28 PROCEDURE — 93280 PM DEVICE PROGR EVAL DUAL: CPT | Performed by: NURSE PRACTITIONER

## 2024-05-30 ENCOUNTER — OFFICE VISIT (OUTPATIENT)
Dept: HEMATOLOGY/ONCOLOGY | Facility: CLINIC | Age: 84
End: 2024-05-30
Payer: MEDICARE

## 2024-05-30 VITALS
RESPIRATION RATE: 18 BRPM | WEIGHT: 194.12 LBS | TEMPERATURE: 97.2 F | SYSTOLIC BLOOD PRESSURE: 148 MMHG | DIASTOLIC BLOOD PRESSURE: 83 MMHG | OXYGEN SATURATION: 96 % | BODY MASS INDEX: 31.33 KG/M2 | HEART RATE: 70 BPM

## 2024-05-30 DIAGNOSIS — R00.1 BRADYCARDIA: ICD-10-CM

## 2024-05-30 DIAGNOSIS — D50.9 IRON DEFICIENCY ANEMIA, UNSPECIFIED IRON DEFICIENCY ANEMIA TYPE: Primary | ICD-10-CM

## 2024-05-30 LAB
ALBUMIN SERPL BCP-MCNC: 4.2 G/DL (ref 3.4–5)
ALP SERPL-CCNC: 49 U/L (ref 33–136)
ALT SERPL W P-5'-P-CCNC: 13 U/L (ref 10–52)
ANION GAP SERPL CALC-SCNC: 11 MMOL/L (ref 10–20)
AST SERPL W P-5'-P-CCNC: 16 U/L (ref 9–39)
BASOPHILS # BLD AUTO: 0.02 X10*3/UL (ref 0–0.1)
BASOPHILS NFR BLD AUTO: 0.4 %
BILIRUB SERPL-MCNC: 0.4 MG/DL (ref 0–1.2)
BUN SERPL-MCNC: 20 MG/DL (ref 6–23)
CALCIUM SERPL-MCNC: 9.2 MG/DL (ref 8.6–10.3)
CHLORIDE SERPL-SCNC: 103 MMOL/L (ref 98–107)
CO2 SERPL-SCNC: 29 MMOL/L (ref 21–32)
CREAT SERPL-MCNC: 1.4 MG/DL (ref 0.5–1.3)
EGFRCR SERPLBLD CKD-EPI 2021: 50 ML/MIN/1.73M*2
EOSINOPHIL # BLD AUTO: 0.1 X10*3/UL (ref 0–0.4)
EOSINOPHIL NFR BLD AUTO: 2.2 %
ERYTHROCYTE [DISTWIDTH] IN BLOOD BY AUTOMATED COUNT: 12.4 % (ref 11.5–14.5)
FERRITIN SERPL-MCNC: 72 NG/ML (ref 20–300)
GLUCOSE SERPL-MCNC: 144 MG/DL (ref 74–99)
HCT VFR BLD AUTO: 45.8 % (ref 41–52)
HGB BLD-MCNC: 15 G/DL (ref 13.5–17.5)
IMM GRANULOCYTES # BLD AUTO: 0 X10*3/UL (ref 0–0.5)
IMM GRANULOCYTES NFR BLD AUTO: 0 % (ref 0–0.9)
IRON SATN MFR SERPL: 25 % (ref 25–45)
IRON SERPL-MCNC: 76 UG/DL (ref 35–150)
LYMPHOCYTES # BLD AUTO: 0.87 X10*3/UL (ref 0.8–3)
LYMPHOCYTES NFR BLD AUTO: 19 %
MCH RBC QN AUTO: 29 PG (ref 26–34)
MCHC RBC AUTO-ENTMCNC: 32.8 G/DL (ref 32–36)
MCV RBC AUTO: 88 FL (ref 80–100)
MONOCYTES # BLD AUTO: 0.48 X10*3/UL (ref 0.05–0.8)
MONOCYTES NFR BLD AUTO: 10.5 %
NEUTROPHILS # BLD AUTO: 3.12 X10*3/UL (ref 1.6–5.5)
NEUTROPHILS NFR BLD AUTO: 67.9 %
PLATELET # BLD AUTO: 154 X10*3/UL (ref 150–450)
POTASSIUM SERPL-SCNC: 4.2 MMOL/L (ref 3.5–5.3)
PROT SERPL-MCNC: 7.1 G/DL (ref 6.4–8.2)
RBC # BLD AUTO: 5.18 X10*6/UL (ref 4.5–5.9)
SODIUM SERPL-SCNC: 139 MMOL/L (ref 136–145)
TIBC SERPL-MCNC: 308 UG/DL (ref 240–445)
UIBC SERPL-MCNC: 232 UG/DL (ref 110–370)
WBC # BLD AUTO: 4.6 X10*3/UL (ref 4.4–11.3)

## 2024-05-30 PROCEDURE — 83540 ASSAY OF IRON: CPT | Performed by: INTERNAL MEDICINE

## 2024-05-30 PROCEDURE — 85025 COMPLETE CBC W/AUTO DIFF WBC: CPT | Performed by: INTERNAL MEDICINE

## 2024-05-30 PROCEDURE — 3077F SYST BP >= 140 MM HG: CPT | Performed by: INTERNAL MEDICINE

## 2024-05-30 PROCEDURE — 1159F MED LIST DOCD IN RCRD: CPT | Performed by: INTERNAL MEDICINE

## 2024-05-30 PROCEDURE — 99214 OFFICE O/P EST MOD 30 MIN: CPT | Performed by: INTERNAL MEDICINE

## 2024-05-30 PROCEDURE — 82728 ASSAY OF FERRITIN: CPT | Performed by: INTERNAL MEDICINE

## 2024-05-30 PROCEDURE — 82607 VITAMIN B-12: CPT | Mod: GEALAB | Performed by: INTERNAL MEDICINE

## 2024-05-30 PROCEDURE — 80053 COMPREHEN METABOLIC PANEL: CPT | Performed by: INTERNAL MEDICINE

## 2024-05-30 PROCEDURE — 1126F AMNT PAIN NOTED NONE PRSNT: CPT | Performed by: INTERNAL MEDICINE

## 2024-05-30 PROCEDURE — 1160F RVW MEDS BY RX/DR IN RCRD: CPT | Performed by: INTERNAL MEDICINE

## 2024-05-30 PROCEDURE — 3079F DIAST BP 80-89 MM HG: CPT | Performed by: INTERNAL MEDICINE

## 2024-05-30 PROCEDURE — 1157F ADVNC CARE PLAN IN RCRD: CPT | Performed by: INTERNAL MEDICINE

## 2024-05-30 ASSESSMENT — COLUMBIA-SUICIDE SEVERITY RATING SCALE - C-SSRS
6. HAVE YOU EVER DONE ANYTHING, STARTED TO DO ANYTHING, OR PREPARED TO DO ANYTHING TO END YOUR LIFE?: NO
2. HAVE YOU ACTUALLY HAD ANY THOUGHTS OF KILLING YOURSELF?: NO
1. IN THE PAST MONTH, HAVE YOU WISHED YOU WERE DEAD OR WISHED YOU COULD GO TO SLEEP AND NOT WAKE UP?: NO

## 2024-05-30 ASSESSMENT — PAIN SCALES - GENERAL: PAINLEVEL: 0-NO PAIN

## 2024-05-30 NOTE — PROGRESS NOTES
Patient ID: Nahum Steward is a 84 y.o. male.  Referring Physician: Jonh Huitron MD  [id number] [place] [address]  Primary Care Provider: Kim Dill PA-C      Subjective    HPI    Reason for visit: iron deficiency anemia      HPI      82 year old man with hx of HTN, CHF, CKD, hematuria, COPD, HL, GERD, BPH, CAD s/p CBAG in 2010 and mechanical aortic valve placement on  warfarin previously, embolic stroke in 2014, and GI bleeding in 2018, redo AVR in Jan 2019  taken off wafarin, who is being followed followed for iron deficiency anemia  he chronic microcytic anemia secondary to iron deficiency, and has been on oral iron, ferritin was 13 on 8/25/22 and Tsat was 84% while he is on iron so the iron was discontinued despite his ferritin  was still low, the iron was reintroduced in Dec 2022 when Hg as would be expected dropped to 9.7 with a ferritin of 13 and Tsat of 6%, this led to improvement of hg and MCV in 03/2023 to  13.1 g/dl and MCV of 85 respectively      Her history includes extensive GI work up for GI bleeding in 2018 that included EGD, colonoscopy, enteroscopy and capsule endoscopy,   11/8/2018 enteroscopy showed 4 bleeding angiodysplasia lesion in the stomach and vague non bleeding red spots/petechiae in the jujenum      he feels well now  has decent energy and is active,   appetite is good   BM soft but dark in color while on PO iron, which he takes once a day   he does not have abd pain, nausea or vomiting   tolerates the iron well   denies fever,   breathing significantly improved after the AVR surgery      5/30/24- interval history      Since last visit he was able to follow-up with cardiology and had a pacemaker placed for symptomatic bradycardia  he is feeling well at this time  His breathing symptoms are improved, he has no shortness of breath or chest pain  he denies any new complaints   No dizziness or syncope  denies any overt bleeding   continues on PO iron  every other day   tolerates well   denies nausea, vomiting , no constipation   no additional new complaints      PAST MEDICAL HISTORY:   HTN, CHF, CKD, hematuria, COPD, HL, GERD, BPH, CAD s/p CBAG in 2010 and mechanical aortic valve placement on warfarin, embolic stroke in 2014, and GI bleeding in 2018, redo AVR in Jan 2019      SOCIAL HISTORY:   former smoker   no alcohol   his daughter is an ER MD and other daughter is an ER nurse      FAMILY HISTORY:    sister had liver cancer   No other specific history of bleeding, clotting or malignant disorder in the family.     REVIEW OF SYSTEMS:  Pertinent finding as per the history above.  There are no additional specific symptoms pertaining to eyes, ENT, hematologic, lymphatic, neurological, psychiatric, cardiac,  pulmonary, GI, , endocrine, rheumatic, dermatological, or musculoskeletal systems.  All other systems have been reviewed and generally negative and noncontributory.     PHYSICAL EXAMINATION:    GENERAL:  Age-appropriate, in no acute discomfort.    VITAL SIGNS:  Reviewed in the EMR   HEENT:  Normocephalic and atraumatic.  Mucous membranes are moist. No oral lesions.    NECK:  Supple without lymphadenopathy.  No thyromegaly or bruits.    CHEST:  Clear to auscultation bilaterally.    HEART:  Regular, normal rate, systolic murmur   ABDOMEN:  Soft, nontender, and nondistended. No hepatosplenomegaly.    EXTREMITIES:  No cyanosis, clubbing, mild bilateral edema   NEUROLOGICAL:  Alert, awake, and oriented.  No gross focal deficit.  LYMPHATICS: No significant lymphadenopathy.     LAB DATA:  Latest labs were reviewed in the EMR and from the outside sources.       Review of Systems - Oncology       Objective   BSA: 2.02 meters squared  /83 (BP Location: Left arm, Patient Position: Sitting, BP Cuff Size: Adult)   Pulse 70   Temp 36.2 °C (97.2 °F) (Temporal)   Resp 18   Wt 88 kg (194 lb 1.8 oz)   SpO2 96%   BMI 31.33 kg/m²     Family History   Problem Relation  Name Age of Onset    Liver cancer Mother      Alzheimer's disease Sister      Heart attack Brother       Oncology History    No history exists.       Nahum Steward  reports that he quit smoking about 41 years ago. His smoking use included cigarettes. He started smoking about 64 years ago. He has a 46 pack-year smoking history. He has never used smokeless tobacco.  He  reports no history of alcohol use.  He  reports no history of drug use.    Results for orders placed or performed in visit on 05/30/24 (from the past 24 hour(s))   CBC and Auto Differential   Result Value Ref Range    WBC 4.6 4.4 - 11.3 x10*3/uL    RBC 5.18 4.50 - 5.90 x10*6/uL    Hemoglobin 15.0 13.5 - 17.5 g/dL    Hematocrit 45.8 41.0 - 52.0 %    MCV 88 80 - 100 fL    MCH 29.0 26.0 - 34.0 pg    MCHC 32.8 32.0 - 36.0 g/dL    RDW 12.4 11.5 - 14.5 %    Platelets 154 150 - 450 x10*3/uL    Neutrophils % 67.9 40.0 - 80.0 %    Immature Granulocytes %, Automated 0.0 0.0 - 0.9 %    Lymphocytes % 19.0 13.0 - 44.0 %    Monocytes % 10.5 2.0 - 10.0 %    Eosinophils % 2.2 0.0 - 6.0 %    Basophils % 0.4 0.0 - 2.0 %    Neutrophils Absolute 3.12 1.60 - 5.50 x10*3/uL    Immature Granulocytes Absolute, Automated 0.00 0.00 - 0.50 x10*3/uL    Lymphocytes Absolute 0.87 0.80 - 3.00 x10*3/uL    Monocytes Absolute 0.48 0.05 - 0.80 x10*3/uL    Eosinophils Absolute 0.10 0.00 - 0.40 x10*3/uL    Basophils Absolute 0.02 0.00 - 0.10 x10*3/uL         Performance Status:  Symptomatic; fully ambulatory    Assessment/Plan    1. Anemia      Iron deficiency anemia   history of massive GI bleeding in 2018 while on warfarin with gastric AVMs   more recently had significant hematuria from BPH   both are resolved   but it seems that his iron deficiency was not really adequately replaced   he is now consistently on iron since 2 months and his anemia has improved, remains iron deficient however although the ferritin is moving in the right direction      we discussed continued PO iron  replacement vs IV iron, since he is tolerating PO iron well and there is improvement of Hg, will give an additional 2-3 months of PO iron, if no response then consider IV iron and referral to GI again      obtain additional labs for b12, folic acid and SPEP   he may have additional anemia from CKD but will have first to correct the iron deficiency      6/22/23- he has improvement of his hg almost to normal now and iron parameters continue to improve as well   the plan at this time is to continue PO iron replacement with oral iron every other day   no recent clinical events     9/21/23 - he is in a stable clinical condition, no new complaints   his Hg is stable and the iron parameters continue to improve, mild persistent anemia which could be related to ckd , continue PO iron     1/25/24- doing very well, Hg normal today, ferritin stable overall   Continue on PO iron as he is doing     5/30-   He is status post pacemaker placement  He feels well, his hemoglobin is a stable and no evidence of anemia since around 9 months now  His iron parameters are also improved, continue on oral iron for an additional 3 months, if continued improvement he can probably stop  He wishes to continue the monitoring with PCP which should be okay now with the achieve the stabilization     2. Hx of mechanical AVR      had redo with bioprosthetic valve replacement in 2019 following his massive GI bleeding   off coumadin now    follows cardiology and carvedilol was discontinued recently due to bradycardia     3. Bradycardia   Marked bradycardia today   Not symptomatic, asked him to contact his cardiologist for further instructions     5/30-he is a status post pacemaker placement  He is on low-dose aspirin, if for any reason more anticoagulation would be required they would contact us so we would monitor his hemoglobin more closely     RTC prn     Jonh Huitron MD

## 2024-05-30 NOTE — PATIENT INSTRUCTIONS
Today you met with your hematologist/oncologist.  Recent labs were discussed and questions answered.  Scheduling orders were placed.  While we appreciate that you verbalized understanding, if any questions arise after leaving, please do not hesitate to call the office to discuss.  [phone] Tal Carson  Return to the clinic as needed. Please call if you feel you need to be seen.

## 2024-05-31 LAB — VIT B12 SERPL-MCNC: 583 PG/ML (ref 211–911)

## 2024-06-03 ENCOUNTER — HOSPITAL ENCOUNTER (OUTPATIENT)
Dept: CARDIOLOGY | Facility: CLINIC | Age: 84
Discharge: HOME | End: 2024-06-03
Payer: MEDICARE

## 2024-06-03 DIAGNOSIS — I49.5 SYNCOPE DUE TO SICK SINUS SYNDROME (MULTI): ICD-10-CM

## 2024-06-03 DIAGNOSIS — R55 SYNCOPE DUE TO SICK SINUS SYNDROME (MULTI): ICD-10-CM

## 2024-06-03 DIAGNOSIS — R00.1 SYMPTOMATIC BRADYCARDIA: ICD-10-CM

## 2024-06-05 ENCOUNTER — HOSPITAL ENCOUNTER (OUTPATIENT)
Dept: CARDIOLOGY | Facility: CLINIC | Age: 84
Discharge: HOME | End: 2024-06-05
Payer: MEDICARE

## 2024-06-05 DIAGNOSIS — R55 SYNCOPE DUE TO SICK SINUS SYNDROME (MULTI): ICD-10-CM

## 2024-06-05 DIAGNOSIS — I49.5 SYNCOPE DUE TO SICK SINUS SYNDROME (MULTI): ICD-10-CM

## 2024-06-05 DIAGNOSIS — R00.1 SYMPTOMATIC BRADYCARDIA: ICD-10-CM

## 2024-06-20 ENCOUNTER — OFFICE VISIT (OUTPATIENT)
Dept: CARDIOLOGY | Facility: HOSPITAL | Age: 84
End: 2024-06-20
Payer: MEDICARE

## 2024-06-20 VITALS
HEART RATE: 70 BPM | OXYGEN SATURATION: 94 % | DIASTOLIC BLOOD PRESSURE: 84 MMHG | SYSTOLIC BLOOD PRESSURE: 137 MMHG | WEIGHT: 193.56 LBS | BODY MASS INDEX: 31.24 KG/M2

## 2024-06-20 DIAGNOSIS — I10 ESSENTIAL HYPERTENSION: ICD-10-CM

## 2024-06-20 DIAGNOSIS — Z95.0 PACEMAKER: ICD-10-CM

## 2024-06-20 DIAGNOSIS — Z95.1 S/P CABG X 3: ICD-10-CM

## 2024-06-20 DIAGNOSIS — I48.0 PAROXYSMAL ATRIAL FIBRILLATION (MULTI): ICD-10-CM

## 2024-06-20 DIAGNOSIS — Z95.2 S/P AVR: Primary | ICD-10-CM

## 2024-06-20 PROCEDURE — 1159F MED LIST DOCD IN RCRD: CPT | Performed by: INTERNAL MEDICINE

## 2024-06-20 PROCEDURE — 3079F DIAST BP 80-89 MM HG: CPT | Performed by: INTERNAL MEDICINE

## 2024-06-20 PROCEDURE — 1036F TOBACCO NON-USER: CPT | Performed by: INTERNAL MEDICINE

## 2024-06-20 PROCEDURE — 99214 OFFICE O/P EST MOD 30 MIN: CPT | Performed by: INTERNAL MEDICINE

## 2024-06-20 PROCEDURE — 1157F ADVNC CARE PLAN IN RCRD: CPT | Performed by: INTERNAL MEDICINE

## 2024-06-20 PROCEDURE — 3075F SYST BP GE 130 - 139MM HG: CPT | Performed by: INTERNAL MEDICINE

## 2024-06-20 NOTE — PROGRESS NOTES
Chief Complaint:   Follow-up     History Of Present Illness:    Nahum Steward is a 84 y.o. male presenting for follow-up.  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  He is feeling significantly better overall with more energy.  Denies any angina, shortness of breath, palpitations, dizziness.  Compliant with medications.     Last Recorded Vitals:  Vitals:    06/20/24 1049   BP: 137/84   Pulse: 70   SpO2: 94%   Weight: 87.8 kg (193 lb 9 oz)       Past Medical History:  He has a past medical history of CHF (congestive heart failure) (Multi), Coronary artery disease, Hyperlipidemia, and Hypertension.    Past Surgical History:  He has a past surgical history that includes Colonoscopy (10/02/2018); Prostate surgery (10/02/2018); Coronary artery bypass graft (02/18/2019); Aortic valve replacement (02/18/2019); Other surgical history (02/18/2019); CT angio abdomen pelvis w and or wo IV IV contrast (11/26/2018); IR angiogram inferior epigastric (11/29/2018); IR angiogram inferior epigastric pelvic (11/29/2018); IR angiogram inferior epigastric pelvic (11/29/2018); IR embolization lymph node (Bilateral, 11/29/2018); IR angiogram inferior epigastric pelvic (11/29/2018); CT angio abdomen pelvis w and or wo IV IV contrast (11/12/2018); and Cardiac electrophysiology procedure (N/A, 2/29/2024).      Social History:  He reports that he quit smoking about 41 years ago. His smoking use included cigarettes. He started smoking about 64 years ago. He has a 46 pack-year smoking history. He has never used smokeless tobacco. He reports that he does not drink alcohol and does not use drugs.    Family History:  Family History   Problem Relation Name Age of Onset    Liver cancer Mother      Alzheimer's disease Sister      Heart attack Brother          Allergies:  Bee venom protein (honey bee) and Doxepin    Outpatient Medications:  Current Outpatient Medications   Medication  Instructions    acetaminophen (Tylenol) 325 mg tablet oral, Every 6 hours PRN    ascorbic acid (VITAMIN C) 1,000 mg, oral, Every 24 hours    aspirin 81 mg EC tablet 1 tablet, oral, Daily    atorvastatin (LIPITOR) 10 mg, oral, Daily    cholecalciferol (VITAMIN D3) 50 mcg, oral, Daily    famotidine (Pepcid) 20 mg tablet 1 tablet, oral, Daily    ferrous sulfate 325 (65 Fe) MG tablet 1 tablet, oral, 2 times daily    finasteride (PROSCAR) 5 mg, oral, Daily    fish,bora,flax oils-om3,6,9no1 (Omega 3-6-9) 1,200 mg capsule oral    fluticasone (Flonase) 50 mcg/actuation nasal spray Shake gently. Before first use, prime pump. After use, clean tip and replace cap.    isosorbide mononitrate ER (Imdur) 30 mg 24 hr tablet 1 tablet, oral, Daily    losartan (Cozaar) 25 mg tablet 1 tablet, oral, Daily    magnesium oxide/magnesium (MAGNESIUM OXIDE-MG AA CHELATE ORAL) 1 tablet, oral, 2 times daily    MULTIVIT 33-MTFOLATE-NAC-CHROM ORAL oral    ramelteon (ROZEREM) 8 mg, oral, Nightly PRN    tamsulosin (Flomax) 0.4 mg 24 hr capsule Take 1 capsule by mouth once daily    torsemide (DEMADEX) 20 mg, oral, Daily       Physical Exam:  Constitutional:       Appearance: Healthy appearance. Not in distress.   Neck:      Vascular: No JVR. JVD normal.   Pulmonary:      Effort: Pulmonary effort is normal.      Breath sounds: Normal breath sounds. No wheezing. No rhonchi. No rales.   Chest:      Chest wall: Not tender to palpatation.   Cardiovascular:      Normal rate. Regular rhythm.      Murmurs: There is no murmur.   Edema:     Peripheral edema absent.   Abdominal:      General: Bowel sounds are normal.      Palpations: Abdomen is soft.      Tenderness: There is no abdominal tenderness.   Musculoskeletal: Normal range of motion. Skin:     General: Skin is warm and dry.   Neurological:      General: No focal deficit present.      Mental Status: Alert and oriented to person, place and time.           Last Labs:  CBC -  Lab Results   Component Value  Date    WBC 4.6 05/30/2024    HGB 15.0 05/30/2024    HCT 45.8 05/30/2024    MCV 88 05/30/2024     05/30/2024       CMP -  Lab Results   Component Value Date    CALCIUM 9.2 05/30/2024    PHOS 3.5 02/16/2024    PROT 7.1 05/30/2024    ALBUMIN 4.2 05/30/2024    AST 16 05/30/2024    ALT 13 05/30/2024    ALKPHOS 49 05/30/2024    BILITOT 0.4 05/30/2024       LIPID PANEL -   Lab Results   Component Value Date    CHOL 96 04/27/2021    TRIG 162 (H) 04/27/2021    HDL 23.0 (A) 04/27/2021    CHHDL 4.2 04/27/2021    LDLF 41 04/27/2021    VLDL 32 04/27/2021    NHDL 102 01/08/2020       RENAL FUNCTION PANEL -   Lab Results   Component Value Date    GLUCOSE 144 (H) 05/30/2024     05/30/2024    K 4.2 05/30/2024     05/30/2024    CO2 29 05/30/2024    ANIONGAP 11 05/30/2024    BUN 20 05/30/2024    CREATININE 1.40 (H) 05/30/2024    GFRMALE CANCELED 09/22/2023    CALCIUM 9.2 05/30/2024    PHOS 3.5 02/16/2024    ALBUMIN 4.2 05/30/2024        Lab Results   Component Value Date    BNP 96 03/16/2020    HGBA1C 6.3 01/24/2020       Last Cardiology Tests:  Echo 2/2024:   1. Left ventricular systolic function is normal with a 60-65% estimated ejection fraction.   2. Spectral Doppler shows an impaired relaxation pattern of left ventricular diastolic filling.   3. There is a bioprosthetic aortic valve.   4. There is trace-mild mitral, tricuspid and pulmonic regurgitation.       Stress test 2/15/2019   1. The blunted heart rate diminishes the sensitivity of this test.   2. Wenckebach (Mobitz 1 - 2nd degree AV block) during recovery.     Intraoperative ALBERTO 1/8/2019   1. The left ventricular systolic function is normal.   2. Moderately increased left ventricular septal thickness.   3. The right atrium is severely dilated.   4. No left atrial thrombus.   5. There is no evidence of a patent foramen ovale.     Cath 12/31/18:   1. Left main: no significant angiographic disease.   2. LAD: 30% ostial stenosis, 50-60% mid-vessel  stenosis.   3. LCx: mild luminal irregularities.   4. RCA: small nondominant vessel, 95% diffuse mid-vessel disease.   5. 0/2 patent saphenous vein grafts, LIMA injected: not utilized on prior  bypass.     Assessment/Plan   Very pleasant 84 y.o. male with history of CAD, CHF, GERD, HTN, dyslipidemia, CABG x 3? SVG to RCA. (2000), original mechanical AVR (2010) on Coumadin with multiple admissions in 2018 for GI bleeding requiring multiple blood products complicated by APRYL. Patient underwent redo AVR on 1/8/2019 with MagnaEase valve, s/p PPM on 2/29/24 for symptomatic bradycardia/2:1 AVB.  Overall feeling much better after pacemaker placement, doing well from a cardiac standpoint.     Plan:  -Continue current aspirin 81 mg daily, atorvastatin 10 mg daily, Imdur, losartan, torsemide  -Was noted to have 7 hours of A-fib 1 time on pacer evaluation in March--will defer empiric anticoagulation given life-threatening GI bleeding on anticoagulation in the past, will keep a close eye on A-fib burden with consideration of watchman if has any recurrence on pacemaker checks  -Follow-up in 6 months or sooner if needed      Sukhdev Garcia MD

## 2024-06-24 ENCOUNTER — LAB (OUTPATIENT)
Dept: LAB | Facility: LAB | Age: 84
End: 2024-06-24
Payer: MEDICARE

## 2024-06-24 DIAGNOSIS — E78.49 OTHER HYPERLIPIDEMIA: ICD-10-CM

## 2024-06-24 DIAGNOSIS — N18.30 CHRONIC KIDNEY DISEASE, STAGE 3 UNSPECIFIED (MULTI): Primary | ICD-10-CM

## 2024-06-24 DIAGNOSIS — I10 ESSENTIAL (PRIMARY) HYPERTENSION: ICD-10-CM

## 2024-06-24 LAB
ALBUMIN SERPL BCP-MCNC: 4.2 G/DL (ref 3.4–5)
ANION GAP SERPL CALC-SCNC: 12 MMOL/L (ref 10–20)
APPEARANCE UR: CLEAR
BILIRUB UR STRIP.AUTO-MCNC: NEGATIVE MG/DL
BUN SERPL-MCNC: 23 MG/DL (ref 6–23)
CALCIUM SERPL-MCNC: 9.2 MG/DL (ref 8.6–10.3)
CHLORIDE SERPL-SCNC: 100 MMOL/L (ref 98–107)
CO2 SERPL-SCNC: 32 MMOL/L (ref 21–32)
COLOR UR: NORMAL
CREAT SERPL-MCNC: 1.55 MG/DL (ref 0.5–1.3)
CREAT UR-MCNC: 101.4 MG/DL (ref 20–370)
EGFRCR SERPLBLD CKD-EPI 2021: 44 ML/MIN/1.73M*2
GLUCOSE SERPL-MCNC: 112 MG/DL (ref 74–99)
GLUCOSE UR STRIP.AUTO-MCNC: NORMAL MG/DL
KETONES UR STRIP.AUTO-MCNC: NEGATIVE MG/DL
LEUKOCYTE ESTERASE UR QL STRIP.AUTO: NEGATIVE
MICROALBUMIN UR-MCNC: 80.6 MG/L
MICROALBUMIN/CREAT UR: 79.5 UG/MG CREAT
NITRITE UR QL STRIP.AUTO: NEGATIVE
PH UR STRIP.AUTO: 5.5 [PH]
PHOSPHATE SERPL-MCNC: 3.1 MG/DL (ref 2.5–4.9)
POTASSIUM SERPL-SCNC: 3.8 MMOL/L (ref 3.5–5.3)
PROT UR STRIP.AUTO-MCNC: NEGATIVE MG/DL
RBC # UR STRIP.AUTO: NEGATIVE /UL
SODIUM SERPL-SCNC: 140 MMOL/L (ref 136–145)
SP GR UR STRIP.AUTO: 1.01
UROBILINOGEN UR STRIP.AUTO-MCNC: NORMAL MG/DL

## 2024-06-24 PROCEDURE — 82570 ASSAY OF URINE CREATININE: CPT

## 2024-06-24 PROCEDURE — 81003 URINALYSIS AUTO W/O SCOPE: CPT

## 2024-06-24 PROCEDURE — 82043 UR ALBUMIN QUANTITATIVE: CPT

## 2024-06-24 PROCEDURE — 80069 RENAL FUNCTION PANEL: CPT

## 2024-06-27 DIAGNOSIS — F51.01 PRIMARY INSOMNIA: ICD-10-CM

## 2024-06-27 RX ORDER — RAMELTEON 8 MG/1
8 TABLET ORAL NIGHTLY PRN
Qty: 90 TABLET | Refills: 0 | Status: SHIPPED | OUTPATIENT
Start: 2024-06-27

## 2024-07-06 DIAGNOSIS — I10 BENIGN ESSENTIAL HYPERTENSION: Primary | ICD-10-CM

## 2024-07-06 DIAGNOSIS — N13.8 BPH WITH URINARY OBSTRUCTION: ICD-10-CM

## 2024-07-06 DIAGNOSIS — N40.1 BPH WITH URINARY OBSTRUCTION: ICD-10-CM

## 2024-07-08 RX ORDER — TAMSULOSIN HYDROCHLORIDE 0.4 MG/1
CAPSULE ORAL
Qty: 90 CAPSULE | Refills: 0 | Status: SHIPPED | OUTPATIENT
Start: 2024-07-08

## 2024-07-09 RX ORDER — ISOSORBIDE MONONITRATE 30 MG/1
30 TABLET, EXTENDED RELEASE ORAL DAILY
Qty: 90 TABLET | Refills: 1 | Status: SHIPPED | OUTPATIENT
Start: 2024-07-09 | End: 2025-01-05

## 2024-07-18 DIAGNOSIS — E55.9 VITAMIN D DEFICIENCY: ICD-10-CM

## 2024-07-18 RX ORDER — NICOTINE 11MG/24HR
PATCH, TRANSDERMAL 24 HOURS TRANSDERMAL DAILY
Qty: 90 CAPSULE | Refills: 0 | Status: SHIPPED | OUTPATIENT
Start: 2024-07-18

## 2024-08-03 DIAGNOSIS — E78.00 HYPERCHOLESTEROLEMIA: ICD-10-CM

## 2024-08-05 RX ORDER — ATORVASTATIN CALCIUM 10 MG/1
10 TABLET, FILM COATED ORAL DAILY
Qty: 90 TABLET | Refills: 0 | Status: SHIPPED | OUTPATIENT
Start: 2024-08-05

## 2024-08-08 ENCOUNTER — APPOINTMENT (OUTPATIENT)
Dept: CARDIOLOGY | Facility: HOSPITAL | Age: 84
End: 2024-08-08
Payer: MEDICARE

## 2024-08-13 DIAGNOSIS — N40.1 BPH WITH URINARY OBSTRUCTION: ICD-10-CM

## 2024-08-13 DIAGNOSIS — R09.82 POSTNASAL DRIP: ICD-10-CM

## 2024-08-13 DIAGNOSIS — N13.8 BPH WITH URINARY OBSTRUCTION: ICD-10-CM

## 2024-08-13 RX ORDER — FINASTERIDE 5 MG/1
5 TABLET, FILM COATED ORAL DAILY
Qty: 90 TABLET | Refills: 0 | Status: SHIPPED | OUTPATIENT
Start: 2024-08-13

## 2024-08-13 RX ORDER — FLUTICASONE PROPIONATE 50 MCG
SPRAY, SUSPENSION (ML) NASAL
Qty: 16 G | Refills: 2 | Status: SHIPPED | OUTPATIENT
Start: 2024-08-13

## 2024-08-13 RX ORDER — FLUTICASONE PROPIONATE 50 MCG
SPRAY, SUSPENSION (ML) NASAL
Qty: 16 G | Refills: 0 | Status: SHIPPED | OUTPATIENT
Start: 2024-08-13 | End: 2024-08-13 | Stop reason: SDUPTHER

## 2024-09-13 RX ORDER — FLUCONAZOLE 150 MG/1
TABLET ORAL
COMMUNITY
Start: 2024-03-04 | End: 2024-09-17 | Stop reason: ALTCHOICE

## 2024-09-17 ENCOUNTER — APPOINTMENT (OUTPATIENT)
Dept: PRIMARY CARE | Facility: CLINIC | Age: 84
End: 2024-09-17
Payer: MEDICARE

## 2024-09-17 VITALS — HEIGHT: 66 IN | BODY MASS INDEX: 32.14 KG/M2 | WEIGHT: 200 LBS | TEMPERATURE: 97.9 F

## 2024-09-17 DIAGNOSIS — I50.9 CONGESTIVE HEART FAILURE, UNSPECIFIED HF CHRONICITY, UNSPECIFIED HEART FAILURE TYPE: ICD-10-CM

## 2024-09-17 DIAGNOSIS — D50.9 IRON DEFICIENCY ANEMIA, UNSPECIFIED IRON DEFICIENCY ANEMIA TYPE: ICD-10-CM

## 2024-09-17 DIAGNOSIS — N18.31 CHRONIC KIDNEY DISEASE, STAGE 3A (MULTI): ICD-10-CM

## 2024-09-17 DIAGNOSIS — Z00.00 ROUTINE GENERAL MEDICAL EXAMINATION AT HEALTH CARE FACILITY: Primary | ICD-10-CM

## 2024-09-17 DIAGNOSIS — I73.9 INTERMITTENT CLAUDICATION (CMS-HCC): ICD-10-CM

## 2024-09-17 DIAGNOSIS — R00.1 BRADYCARDIA ON ECG: ICD-10-CM

## 2024-09-17 DIAGNOSIS — J41.0 SIMPLE CHRONIC BRONCHITIS (MULTI): ICD-10-CM

## 2024-09-17 DIAGNOSIS — R26.81 UNSTEADY GAIT: ICD-10-CM

## 2024-09-17 DIAGNOSIS — R60.0 BILATERAL LEG EDEMA: ICD-10-CM

## 2024-09-17 DIAGNOSIS — I10 BENIGN ESSENTIAL HYPERTENSION: ICD-10-CM

## 2024-09-17 DIAGNOSIS — Z95.0 PACEMAKER: Primary | ICD-10-CM

## 2024-09-17 DIAGNOSIS — K21.9 GASTROESOPHAGEAL REFLUX DISEASE WITHOUT ESOPHAGITIS: ICD-10-CM

## 2024-09-17 PROCEDURE — 1036F TOBACCO NON-USER: CPT | Performed by: PHYSICIAN ASSISTANT

## 2024-09-17 PROCEDURE — 1123F ACP DISCUSS/DSCN MKR DOCD: CPT | Performed by: PHYSICIAN ASSISTANT

## 2024-09-17 PROCEDURE — 1157F ADVNC CARE PLAN IN RCRD: CPT | Performed by: PHYSICIAN ASSISTANT

## 2024-09-17 PROCEDURE — G0439 PPPS, SUBSEQ VISIT: HCPCS | Performed by: PHYSICIAN ASSISTANT

## 2024-09-17 PROCEDURE — 1160F RVW MEDS BY RX/DR IN RCRD: CPT | Performed by: PHYSICIAN ASSISTANT

## 2024-09-17 PROCEDURE — 1126F AMNT PAIN NOTED NONE PRSNT: CPT | Performed by: PHYSICIAN ASSISTANT

## 2024-09-17 PROCEDURE — 1170F FXNL STATUS ASSESSED: CPT | Performed by: PHYSICIAN ASSISTANT

## 2024-09-17 PROCEDURE — 1159F MED LIST DOCD IN RCRD: CPT | Performed by: PHYSICIAN ASSISTANT

## 2024-09-17 PROCEDURE — 1158F ADVNC CARE PLAN TLK DOCD: CPT | Performed by: PHYSICIAN ASSISTANT

## 2024-09-17 ASSESSMENT — ENCOUNTER SYMPTOMS
JOINT SWELLING: 0
FACIAL SWELLING: 0
POLYPHAGIA: 0
DEPRESSION: 0
ABDOMINAL PAIN: 0
NAUSEA: 0
HEADACHES: 0
ARTHRALGIAS: 0
FEVER: 0
DIARRHEA: 0
EYE PAIN: 0
ABDOMINAL DISTENTION: 0
PALPITATIONS: 0
VOMITING: 0
FATIGUE: 0
CHILLS: 0
HEMATURIA: 0
APPETITE CHANGE: 0
COLOR CHANGE: 0
FREQUENCY: 0
NERVOUS/ANXIOUS: 0
MYALGIAS: 0
DIZZINESS: 0
TREMORS: 0
COUGH: 0
WHEEZING: 0
SORE THROAT: 0
SLEEP DISTURBANCE: 0
CHEST TIGHTNESS: 0
WEAKNESS: 0
LOSS OF SENSATION IN FEET: 0
POLYDIPSIA: 0
CHOKING: 0
OCCASIONAL FEELINGS OF UNSTEADINESS: 0
CONSTIPATION: 0
SHORTNESS OF BREATH: 0
EYE DISCHARGE: 0
NUMBNESS: 0
DIFFICULTY URINATING: 0
ANAL BLEEDING: 0
CONFUSION: 0

## 2024-09-17 ASSESSMENT — ACTIVITIES OF DAILY LIVING (ADL)
DOING HOUSEWORK: INDEPENDENT
ADEQUATE_TO_COMPLETE_ADL: YES
BATHING: INDEPENDENT
STIL DRIVING: NO
TAKING_MEDICATION: INDEPENDENT
PILL BOX USED: YES
HEARING - LEFT EAR: FUNCTIONAL
GROCERY SHOPPING: NEEDS ASSISTANCE
TAKING MEDICATION: INDEPENDENT
EATING: INDEPENDENT
HEARING - RIGHT EAR: FUNCTIONAL
GROCERY_SHOPPING: NEEDS ASSISTANCE
NEEDS ASSISTANCE WITH FOOD: INDEPENDENT
PREPARING MEALS: INDEPENDENT
DRESSING YOURSELF: INDEPENDENT
FEEDING YOURSELF: INDEPENDENT
TOILETING: INDEPENDENT
JUDGMENT_ADEQUATE_SAFELY_COMPLETE_DAILY_ACTIVITIES: YES
MANAGING_FINANCES: INDEPENDENT
DOING_HOUSEWORK: NEEDS ASSISTANCE
PATIENT'S MEMORY ADEQUATE TO SAFELY COMPLETE DAILY ACTIVITIES?: YES
GROOMING: INDEPENDENT
USING TELEPHONE: INDEPENDENT
WALKS IN HOME: INDEPENDENT
USING TRANSPORTATION: NEEDS ASSISTANCE
MANAGING FINANCES: INDEPENDENT

## 2024-09-17 ASSESSMENT — COLUMBIA-SUICIDE SEVERITY RATING SCALE - C-SSRS
2. HAVE YOU ACTUALLY HAD ANY THOUGHTS OF KILLING YOURSELF?: NO
1. IN THE PAST MONTH, HAVE YOU WISHED YOU WERE DEAD OR WISHED YOU COULD GO TO SLEEP AND NOT WAKE UP?: NO
6. HAVE YOU EVER DONE ANYTHING, STARTED TO DO ANYTHING, OR PREPARED TO DO ANYTHING TO END YOUR LIFE?: NO

## 2024-09-17 ASSESSMENT — GERIATRIC MINI NUTRITIONAL ASSESSMENT (MNA)
E NEUROPSYCHOLOGICAL PROBLEMS: NO PSYCHOLOGICAL PROBLEMS
A HAS FOOD INTAKE DECLINED OVER THE PAST 3 MONTHS DUE TO LOSS OF APPETITE, DIGESTIVE PROBLEMS, CHEWING OR SWALLOWING DIFFICULTIES?: NO DECREASE IN FOOD INTAKE
D HAS SUFFERED PSYCHOLOGICAL STRESS OR ACUTE DISEASE IN THE PAST 3 MONTHS?: NO
C GENERAL MOBILITY: GOES OUT
B WEIGHT LOSS DURING THE LAST 3 MONTHS: NO WEIGHT LOSS

## 2024-09-17 ASSESSMENT — PAIN SCALES - GENERAL: PAINLEVEL: 0-NO PAIN

## 2024-09-17 NOTE — PROGRESS NOTES
Subjective   Reason for Visit: Nahum Steward is an 84 y.o. male with a history of history of CABG in 2000, coronary artery bypass graft surgery with   saphenous vein graft to right coronary artery, replacement of mechanical valve with tissue valve in 2019, hypertension, CHF, insomnia, hyperlipidemia, APRYL here for a Medicare Wellness visit.     Past Medical, Surgical, and Family History reviewed and updated in chart.    Reviewed all medications by prescribing practitioner or clinical pharmacist (such as prescriptions, OTCs, herbal therapies and supplements) and documented in the medical record.    HPI the patient is presented for wellness exam. He underwent pacemaker placement in February for bradycardia.  He is doing well since then.  Denies shortness of breath or chest pain.   His hematuria had improved since he started taking finasteride in addition to tamsulosin and anemia is improving as well.  Currently on oral iron supplement.    Patient Care Team:  Kim Dlil PA-C as PCP - General (Internal Medicine)  Jonh Huitron MD as Medical Oncologist (Hematology and Oncology)     Review of Systems   Constitutional:  Negative for appetite change, chills, fatigue and fever.   HENT:  Negative for congestion, ear pain, facial swelling, hearing loss, nosebleeds and sore throat.    Eyes:  Negative for pain, discharge and visual disturbance.   Respiratory:  Negative for cough, choking, chest tightness, shortness of breath and wheezing.    Cardiovascular:  Negative for chest pain, palpitations and leg swelling.   Gastrointestinal:  Negative for abdominal distention, abdominal pain, anal bleeding, constipation, diarrhea, nausea and vomiting.   Endocrine: Negative for cold intolerance, heat intolerance, polydipsia, polyphagia and polyuria.   Genitourinary:  Negative for difficulty urinating, frequency, hematuria and urgency.   Musculoskeletal:  Negative for arthralgias, gait problem, joint swelling and myalgias.   Skin:  " Negative for color change and rash.   Neurological:  Negative for dizziness, tremors, syncope, weakness, numbness and headaches.   Psychiatric/Behavioral:  Negative for behavioral problems, confusion, sleep disturbance and suicidal ideas. The patient is not nervous/anxious.        Objective   Vitals:  Temp 36.6 °C (97.9 °F) (Temporal)   Ht 1.676 m (5' 6\")   Wt 90.7 kg (200 lb)   BMI 32.28 kg/m²       Physical Exam  Constitutional:       General: He is not in acute distress.     Appearance: Normal appearance.   HENT:      Head: Normocephalic and atraumatic.      Nose: Nose normal.   Eyes:      Extraocular Movements: Extraocular movements intact.      Conjunctiva/sclera: Conjunctivae normal.      Pupils: Pupils are equal, round, and reactive to light.   Cardiovascular:      Rate and Rhythm: Normal rate and regular rhythm.      Pulses: Normal pulses.      Heart sounds: Normal heart sounds.   Pulmonary:      Effort: Pulmonary effort is normal.      Breath sounds: Normal breath sounds.   Abdominal:      General: Bowel sounds are normal.      Palpations: Abdomen is soft.   Musculoskeletal:         General: Normal range of motion.      Cervical back: Normal range of motion and neck supple.   Neurological:      General: No focal deficit present.      Mental Status: He is alert and oriented to person, place, and time.   Psychiatric:         Mood and Affect: Mood normal.         Behavior: Behavior normal.         Thought Content: Thought content normal.         Judgment: Judgment normal.         Assessment & Plan  Routine general medical examination at health care facility    Orders:    1 Year Follow Up In Primary Care - Wellness Exam; Future    Chronic kidney disease, stage 3a (Multi)         Congestive heart failure, unspecified HF chronicity, unspecified heart failure type (Multi)         Simple chronic bronchitis (Multi)         Intermittent claudication (CMS-HCC)         Benign essential hypertension         Iron " deficiency anemia, unspecified iron deficiency anemia type         Bilateral leg edema         Gastroesophageal reflux disease without esophagitis         Unsteady gait                Bradycardia  S/p pacemaker in February   Doing well  Follow-up with cardiology Dr. Garcia    CHF  Stable  Continue torsemide 20 mg twice daily  Continue losartan 25 mg daily  Follow-up with cardiology Dr. Garcia as scheduled     CAD  S/p CABG in 2010 and mechanical aortic valve placement,   S/p AVR in Jan 2019     Stable   Continue blood pressure medications  Continue aspirin 81 mg daily  Continue atorvastatin 10 mg daily  Follow-up with cardiology as scheduled     HTN  Well controlled   Continue furosemide 40 mg daily  Continue losartan 25 mg daily  Monitor blood pressure daily  No added salt diet, do not add salt to your food  Try to exercise every other day for 30 minutes    BPH  Continue finasteride 5 mg daily  Continue tamsulosin 0.4 mg once daily  Follow-up with urology Dr. Meehan     Anemia   Improved  Continue oral iron supplement twice daily  Follow-up with hematology Dr. Huitron     Leg edema  Stable  Elevate feet  Wear compression stockings  Continue torsemide 20 mg daily     COPD  Stable  Continue albuterol as needed  follow up with pulmonology      Dyslipidemia  Continue atorvastatin 10 mg daily  Continue omega 3 daily 1000 mg daily  Continue with the low fat, low cholesterol diet  I recommend Mediterranean diet, which include fish, chicken, vegetables and olive oil  Exercise daily for 30 minutes at least 3 times a week    Acid reflux  Avoid caffeine and alcoholic beverages  Avoid spicy and acidic food, such as tomato and citrus fruits  Avoid onions, peppermint and spearmint   Eat small, frequent meals  Do not eat late at night, at least 3 hours before bed  Raise the head of the bed 6-8 inches for sleeping  Wear lose-fitting clothes around your waist   Continue famotidine 20 mg twice daily before meals      Insomnia  Continue ramelteon 8 mg at bedtime    BMI 29.57 kg/m2  Lost 35 pounds with intermittent fasting  Low fat, low cholesterol diet, low carbohydrate diet  Exercise at least 30 minutes daily     Wellness exam  Colonoscopy done in 2018 repeat in 5 years, patient declined since last colonoscopy was normal  Pneumovax 9/2/2022  Flu vaccine declined  COVID-vaccine 3/29/2022  See your dentist twice a year  Yearly eye exam  see dermatology once a year for a skin check.    Follow-up in 3 months

## 2024-09-21 DIAGNOSIS — F51.01 PRIMARY INSOMNIA: ICD-10-CM

## 2024-09-23 RX ORDER — RAMELTEON 8 MG/1
8 TABLET ORAL NIGHTLY PRN
Qty: 90 TABLET | Refills: 0 | Status: SHIPPED | OUTPATIENT
Start: 2024-09-23

## 2024-09-24 ENCOUNTER — HOSPITAL ENCOUNTER (OUTPATIENT)
Dept: CARDIOLOGY | Facility: HOSPITAL | Age: 84
Discharge: HOME | End: 2024-09-24
Payer: MEDICARE

## 2024-09-24 DIAGNOSIS — Z95.0 PACEMAKER: ICD-10-CM

## 2024-09-24 DIAGNOSIS — R00.1 BRADYCARDIA ON ECG: ICD-10-CM

## 2024-09-24 PROCEDURE — 93280 PM DEVICE PROGR EVAL DUAL: CPT | Performed by: NURSE PRACTITIONER

## 2024-09-24 PROCEDURE — 93280 PM DEVICE PROGR EVAL DUAL: CPT

## 2024-10-03 DIAGNOSIS — N40.1 BPH WITH URINARY OBSTRUCTION: ICD-10-CM

## 2024-10-03 DIAGNOSIS — N13.8 BPH WITH URINARY OBSTRUCTION: ICD-10-CM

## 2024-10-03 RX ORDER — TAMSULOSIN HYDROCHLORIDE 0.4 MG/1
CAPSULE ORAL
Qty: 90 CAPSULE | Refills: 0 | Status: SHIPPED | OUTPATIENT
Start: 2024-10-03

## 2024-10-15 DIAGNOSIS — E55.9 VITAMIN D DEFICIENCY: ICD-10-CM

## 2024-10-15 RX ORDER — NICOTINE 11MG/24HR
PATCH, TRANSDERMAL 24 HOURS TRANSDERMAL DAILY
Qty: 90 CAPSULE | Refills: 0 | Status: SHIPPED | OUTPATIENT
Start: 2024-10-15

## 2024-10-26 DIAGNOSIS — E78.00 HYPERCHOLESTEROLEMIA: ICD-10-CM

## 2024-10-28 ENCOUNTER — LAB (OUTPATIENT)
Dept: LAB | Facility: LAB | Age: 84
End: 2024-10-28
Payer: MEDICARE

## 2024-10-28 DIAGNOSIS — E55.9 VITAMIN D DEFICIENCY, UNSPECIFIED: ICD-10-CM

## 2024-10-28 DIAGNOSIS — R80.0 ISOLATED PROTEINURIA: ICD-10-CM

## 2024-10-28 DIAGNOSIS — E78.49 OTHER HYPERLIPIDEMIA: ICD-10-CM

## 2024-10-28 DIAGNOSIS — I10 ESSENTIAL (PRIMARY) HYPERTENSION: ICD-10-CM

## 2024-10-28 DIAGNOSIS — D50.9 IRON DEFICIENCY ANEMIA, UNSPECIFIED IRON DEFICIENCY ANEMIA TYPE: ICD-10-CM

## 2024-10-28 DIAGNOSIS — N18.30 CHRONIC KIDNEY DISEASE, STAGE 3 UNSPECIFIED (MULTI): Primary | ICD-10-CM

## 2024-10-28 LAB
25(OH)D3 SERPL-MCNC: 50 NG/ML (ref 30–100)
ALBUMIN SERPL BCP-MCNC: 3.8 G/DL (ref 3.4–5)
ALBUMIN SERPL BCP-MCNC: 4 G/DL (ref 3.4–5)
ALBUMIN SERPL BCP-MCNC: 4 G/DL (ref 3.4–5)
ALP SERPL-CCNC: 58 U/L (ref 33–136)
ALT SERPL W P-5'-P-CCNC: 16 U/L (ref 10–52)
ANION GAP SERPL CALC-SCNC: 11 MMOL/L (ref 10–20)
ANION GAP SERPL CALC-SCNC: 12 MMOL/L (ref 10–20)
APPEARANCE UR: CLEAR
AST SERPL W P-5'-P-CCNC: 16 U/L (ref 9–39)
BASOPHILS # BLD AUTO: 0.02 X10*3/UL (ref 0–0.1)
BASOPHILS NFR BLD AUTO: 0.4 %
BILIRUB SERPL-MCNC: 0.5 MG/DL (ref 0–1.2)
BILIRUB UR STRIP.AUTO-MCNC: NEGATIVE MG/DL
BUN SERPL-MCNC: 20 MG/DL (ref 6–23)
BUN SERPL-MCNC: 20 MG/DL (ref 6–23)
CALCIUM SERPL-MCNC: 8.9 MG/DL (ref 8.6–10.3)
CALCIUM SERPL-MCNC: 9 MG/DL (ref 8.6–10.3)
CHLORIDE SERPL-SCNC: 100 MMOL/L (ref 98–107)
CHLORIDE SERPL-SCNC: 100 MMOL/L (ref 98–107)
CO2 SERPL-SCNC: 34 MMOL/L (ref 21–32)
CO2 SERPL-SCNC: 34 MMOL/L (ref 21–32)
COLOR UR: YELLOW
CREAT SERPL-MCNC: 1.6 MG/DL (ref 0.5–1.3)
CREAT SERPL-MCNC: 1.64 MG/DL (ref 0.5–1.3)
CREAT UR-MCNC: 108.5 MG/DL (ref 20–370)
EGFRCR SERPLBLD CKD-EPI 2021: 42 ML/MIN/1.73M*2
EGFRCR SERPLBLD CKD-EPI 2021: 43 ML/MIN/1.73M*2
EOSINOPHIL # BLD AUTO: 0.13 X10*3/UL (ref 0–0.4)
EOSINOPHIL NFR BLD AUTO: 2.7 %
ERYTHROCYTE [DISTWIDTH] IN BLOOD BY AUTOMATED COUNT: 12.7 % (ref 11.5–14.5)
FERRITIN SERPL-MCNC: 119 NG/ML (ref 20–300)
GLUCOSE SERPL-MCNC: 112 MG/DL (ref 74–99)
GLUCOSE SERPL-MCNC: 113 MG/DL (ref 74–99)
GLUCOSE UR STRIP.AUTO-MCNC: NORMAL MG/DL
HCT VFR BLD AUTO: 45.4 % (ref 41–52)
HGB BLD-MCNC: 14.9 G/DL (ref 13.5–17.5)
IMM GRANULOCYTES # BLD AUTO: 0.02 X10*3/UL (ref 0–0.5)
IMM GRANULOCYTES NFR BLD AUTO: 0.4 % (ref 0–0.9)
IRON SATN MFR SERPL: 19 % (ref 25–45)
IRON SERPL-MCNC: 58 UG/DL (ref 35–150)
KETONES UR STRIP.AUTO-MCNC: NEGATIVE MG/DL
LEUKOCYTE ESTERASE UR QL STRIP.AUTO: NEGATIVE
LYMPHOCYTES # BLD AUTO: 1.03 X10*3/UL (ref 0.8–3)
LYMPHOCYTES NFR BLD AUTO: 21.8 %
MCH RBC QN AUTO: 28.9 PG (ref 26–34)
MCHC RBC AUTO-ENTMCNC: 32.8 G/DL (ref 32–36)
MCV RBC AUTO: 88 FL (ref 80–100)
MICROALBUMIN UR-MCNC: 49.5 MG/L
MICROALBUMIN/CREAT UR: 45.6 UG/MG CREAT
MONOCYTES # BLD AUTO: 0.68 X10*3/UL (ref 0.05–0.8)
MONOCYTES NFR BLD AUTO: 14.4 %
NEUTROPHILS # BLD AUTO: 2.85 X10*3/UL (ref 1.6–5.5)
NEUTROPHILS NFR BLD AUTO: 60.3 %
NITRITE UR QL STRIP.AUTO: NEGATIVE
NRBC BLD-RTO: 0 /100 WBCS (ref 0–0)
PH UR STRIP.AUTO: 5.5 [PH]
PHOSPHATE SERPL-MCNC: 3.1 MG/DL (ref 2.5–4.9)
PHOSPHATE SERPL-MCNC: 3.1 MG/DL (ref 2.5–4.9)
PLATELET # BLD AUTO: 168 X10*3/UL (ref 150–450)
POTASSIUM SERPL-SCNC: 4.1 MMOL/L (ref 3.5–5.3)
POTASSIUM SERPL-SCNC: 4.1 MMOL/L (ref 3.5–5.3)
PROT SERPL-MCNC: 6.5 G/DL (ref 6.4–8.2)
PROT UR STRIP.AUTO-MCNC: NEGATIVE MG/DL
PTH-INTACT SERPL-MCNC: 58.6 PG/ML (ref 18.5–88)
RBC # BLD AUTO: 5.16 X10*6/UL (ref 4.5–5.9)
RBC # UR STRIP.AUTO: NEGATIVE /UL
SODIUM SERPL-SCNC: 141 MMOL/L (ref 136–145)
SODIUM SERPL-SCNC: 142 MMOL/L (ref 136–145)
SP GR UR STRIP.AUTO: 1.01
TIBC SERPL-MCNC: 300 UG/DL (ref 240–445)
UIBC SERPL-MCNC: 242 UG/DL (ref 110–370)
UROBILINOGEN UR STRIP.AUTO-MCNC: NORMAL MG/DL
VIT B12 SERPL-MCNC: 560 PG/ML (ref 211–911)
WBC # BLD AUTO: 4.7 X10*3/UL (ref 4.4–11.3)

## 2024-10-28 PROCEDURE — 80069 RENAL FUNCTION PANEL: CPT

## 2024-10-28 PROCEDURE — 80053 COMPREHEN METABOLIC PANEL: CPT

## 2024-10-28 PROCEDURE — 84100 ASSAY OF PHOSPHORUS: CPT

## 2024-10-28 PROCEDURE — 82306 VITAMIN D 25 HYDROXY: CPT

## 2024-10-28 PROCEDURE — 83540 ASSAY OF IRON: CPT

## 2024-10-28 PROCEDURE — 82040 ASSAY OF SERUM ALBUMIN: CPT

## 2024-10-28 PROCEDURE — 83970 ASSAY OF PARATHORMONE: CPT

## 2024-10-28 PROCEDURE — 82728 ASSAY OF FERRITIN: CPT

## 2024-10-28 PROCEDURE — 82043 UR ALBUMIN QUANTITATIVE: CPT

## 2024-10-28 PROCEDURE — 81003 URINALYSIS AUTO W/O SCOPE: CPT

## 2024-10-28 PROCEDURE — 85025 COMPLETE CBC W/AUTO DIFF WBC: CPT

## 2024-10-28 PROCEDURE — 83550 IRON BINDING TEST: CPT

## 2024-10-28 PROCEDURE — 82607 VITAMIN B-12: CPT

## 2024-10-28 PROCEDURE — 82570 ASSAY OF URINE CREATININE: CPT

## 2024-10-28 RX ORDER — ATORVASTATIN CALCIUM 10 MG/1
10 TABLET, FILM COATED ORAL DAILY
Qty: 90 TABLET | Refills: 0 | Status: SHIPPED | OUTPATIENT
Start: 2024-10-28

## 2024-11-05 DIAGNOSIS — N40.1 BPH WITH URINARY OBSTRUCTION: ICD-10-CM

## 2024-11-05 DIAGNOSIS — N13.8 BPH WITH URINARY OBSTRUCTION: ICD-10-CM

## 2024-11-08 RX ORDER — FINASTERIDE 5 MG/1
5 TABLET, FILM COATED ORAL DAILY
Qty: 90 TABLET | Refills: 0 | Status: SHIPPED | OUTPATIENT
Start: 2024-11-08

## 2024-12-12 ENCOUNTER — OFFICE VISIT (OUTPATIENT)
Dept: CARDIOLOGY | Facility: HOSPITAL | Age: 84
End: 2024-12-12
Payer: MEDICARE

## 2024-12-12 VITALS
WEIGHT: 198.85 LBS | HEART RATE: 64 BPM | OXYGEN SATURATION: 94 % | BODY MASS INDEX: 32.1 KG/M2 | DIASTOLIC BLOOD PRESSURE: 82 MMHG | SYSTOLIC BLOOD PRESSURE: 157 MMHG

## 2024-12-12 DIAGNOSIS — Z95.2 H/O AORTIC VALVE REPLACEMENT: Primary | ICD-10-CM

## 2024-12-12 DIAGNOSIS — I50.9 OTHER CONGESTIVE HEART FAILURE: ICD-10-CM

## 2024-12-12 DIAGNOSIS — I44.1 MOBITZ TYPE II ATRIOVENTRICULAR BLOCK: ICD-10-CM

## 2024-12-12 DIAGNOSIS — I10 BENIGN ESSENTIAL HYPERTENSION: ICD-10-CM

## 2024-12-12 LAB
ATRIAL RATE: 73 BPM
P AXIS: 7 DEGREES
P OFFSET: 156 MS
P ONSET: 100 MS
PR INTERVAL: 174 MS
Q ONSET: 187 MS
QRS COUNT: 12 BEATS
QRS DURATION: 174 MS
QT INTERVAL: 466 MS
QTC CALCULATION(BAZETT): 513 MS
QTC FREDERICIA: 497 MS
R AXIS: 250 DEGREES
T AXIS: 58 DEGREES
T OFFSET: 420 MS
VENTRICULAR RATE: 73 BPM

## 2024-12-12 PROCEDURE — 1157F ADVNC CARE PLAN IN RCRD: CPT | Performed by: INTERNAL MEDICINE

## 2024-12-12 PROCEDURE — 99213 OFFICE O/P EST LOW 20 MIN: CPT | Performed by: INTERNAL MEDICINE

## 2024-12-12 PROCEDURE — 1159F MED LIST DOCD IN RCRD: CPT | Performed by: INTERNAL MEDICINE

## 2024-12-12 PROCEDURE — 93005 ELECTROCARDIOGRAM TRACING: CPT | Performed by: INTERNAL MEDICINE

## 2024-12-12 PROCEDURE — 3077F SYST BP >= 140 MM HG: CPT | Performed by: INTERNAL MEDICINE

## 2024-12-12 PROCEDURE — 3079F DIAST BP 80-89 MM HG: CPT | Performed by: INTERNAL MEDICINE

## 2024-12-12 PROCEDURE — G2211 COMPLEX E/M VISIT ADD ON: HCPCS | Performed by: INTERNAL MEDICINE

## 2024-12-12 NOTE — PROGRESS NOTES
Chief Complaint:   No chief complaint on file.     History Of Present Illness:    Nahum Steward is a 84 y.o. male presenting for followup.  -denies chest pain/SOB  -no progressive dizziness, palpitaitons, edema  -does note generalized fatigue  -compliant with meds       Last Recorded Vitals:  Vitals:    12/12/24 1051   BP: 157/82   Pulse: 64   SpO2: 94%   Weight: 90.2 kg (198 lb 13.7 oz)       Past Medical History:  He has a past medical history of CHF (congestive heart failure), Coronary artery disease, Hyperlipidemia, and Hypertension.    Past Surgical History:  He has a past surgical history that includes Colonoscopy (10/02/2018); Prostate surgery (10/02/2018); Coronary artery bypass graft (02/18/2019); Aortic valve replacement (02/18/2019); Other surgical history (02/18/2019); CT angio abdomen pelvis w and or wo IV IV contrast (11/26/2018); IR angiogram inferior epigastric (11/29/2018); IR angiogram inferior epigastric pelvic (11/29/2018); IR angiogram inferior epigastric pelvic (11/29/2018); IR embolization lymph node (Bilateral, 11/29/2018); IR angiogram inferior epigastric pelvic (11/29/2018); CT angio abdomen pelvis w and or wo IV IV contrast (11/12/2018); and Cardiac electrophysiology procedure (N/A, 2/29/2024).      Social History:  He reports that he quit smoking about 41 years ago. His smoking use included cigarettes. He started smoking about 64 years ago. He has a 46 pack-year smoking history. He has never used smokeless tobacco. He reports that he does not drink alcohol and does not use drugs.    Family History:  Family History   Problem Relation Name Age of Onset    Liver cancer Mother      Alzheimer's disease Sister      Heart attack Brother          Allergies:  Bee venom protein (honey bee) and Doxepin    Outpatient Medications:  Current Outpatient Medications   Medication Instructions    acetaminophen (Tylenol) 325 mg tablet Every 6 hours PRN    ascorbic acid (VITAMIN C) 1,000 mg, Every 24 hours     aspirin 81 mg EC tablet 1 tablet, Daily    atorvastatin (LIPITOR) 10 mg, oral, Daily    famotidine (Pepcid) 20 mg tablet 1 tablet, Daily    ferrous sulfate 325 (65 Fe) MG tablet 1 tablet, 2 times daily    finasteride (PROSCAR) 5 mg, oral, Daily    fish,bora,flax oils-om3,6,9no1 (Omega 3-6-9) 1,200 mg capsule Take by mouth.    fluticasone (Flonase) 50 mcg/actuation nasal spray Use 2 sprays in each nostril at bedtime SHAKE GENTLY.  BEFORE USE, PRIME PUMP.  AFTER USE, CLEAN TIP AND REPLACE CAP    isosorbide mononitrate ER (IMDUR) 30 mg, oral, Daily    losartan (Cozaar) 25 mg tablet 1 tablet, Daily    magnesium oxide/magnesium (MAGNESIUM OXIDE-MG AA CHELATE ORAL) 1 tablet, 2 times daily    MULTIVIT 33-MTFOLATE-NAC-CHROM ORAL Take by mouth.    ramelteon (ROZEREM) 8 mg, oral, Nightly PRN    tamsulosin (Flomax) 0.4 mg 24 hr capsule Take 1 capsule by mouth once daily    torsemide (DEMADEX) 20 mg, Daily    Vitamin D3 50 mcg (2,000 unit) capsule oral, Daily       Physical Exam:  Constitutional:       Appearance: Healthy appearance. Not in distress.   Neck:      Vascular: No JVR. JVD normal.   Pulmonary:      Effort: Pulmonary effort is normal.      Breath sounds: Normal breath sounds. No wheezing. No rhonchi. No rales.   Chest:      Chest wall: Not tender to palpatation.   Cardiovascular:      Normal rate. Regular rhythm.      Murmurs: There is no murmur.   Edema:     Peripheral edema absent.   Abdominal:      General: Bowel sounds are normal.      Palpations: Abdomen is soft.      Tenderness: There is no abdominal tenderness.   Musculoskeletal: Normal range of motion. Skin:     General: Skin is warm and dry.   Neurological:      General: No focal deficit present.      Mental Status: Alert and oriented to person, place and time.           Last Labs:  CBC -  Lab Results   Component Value Date    WBC 4.7 10/28/2024    HGB 14.9 10/28/2024    HCT 45.4 10/28/2024    MCV 88 10/28/2024     10/28/2024       CMP -  Lab  Results   Component Value Date    CALCIUM 9.0 10/28/2024    PHOS 3.1 10/28/2024    PROT 6.5 10/28/2024    ALBUMIN 3.8 10/28/2024    AST 16 10/28/2024    ALT 16 10/28/2024    ALKPHOS 58 10/28/2024    BILITOT 0.5 10/28/2024       LIPID PANEL -   Lab Results   Component Value Date    CHOL 96 04/27/2021    TRIG 162 (H) 04/27/2021    HDL 23.0 (A) 04/27/2021    CHHDL 4.2 04/27/2021    LDLF 41 04/27/2021    VLDL 32 04/27/2021    NHDL 102 01/08/2020       RENAL FUNCTION PANEL -   Lab Results   Component Value Date    GLUCOSE 113 (H) 10/28/2024     10/28/2024    K 4.1 10/28/2024     10/28/2024    CO2 34 (H) 10/28/2024    ANIONGAP 12 10/28/2024    BUN 20 10/28/2024    CREATININE 1.64 (H) 10/28/2024    GFRMALE CANCELED 09/22/2023    CALCIUM 9.0 10/28/2024    PHOS 3.1 10/28/2024    ALBUMIN 3.8 10/28/2024        Lab Results   Component Value Date    BNP 96 03/16/2020    HGBA1C 6.3 01/24/2020       Last Cardiology Tests:  ECG independently reviewed from today: v-paced    Echo 2/2024:   1. Left ventricular systolic function is normal with a 60-65% estimated ejection fraction.   2. Spectral Doppler shows an impaired relaxation pattern of left ventricular diastolic filling.   3. There is a bioprosthetic aortic valve.   4. There is trace-mild mitral, tricuspid and pulmonic regurgitation.        Stress test 2/15/2019   1. The blunted heart rate diminishes the sensitivity of this test.   2. Wenckebach (Mobitz 1 - 2nd degree AV block) during recovery.     Intraoperative ALBERTO 1/8/2019   1. The left ventricular systolic function is normal.   2. Moderately increased left ventricular septal thickness.   3. The right atrium is severely dilated.   4. No left atrial thrombus.   5. There is no evidence of a patent foramen ovale.     Cath 12/31/18:   1. Left main: no significant angiographic disease.   2. LAD: 30% ostial stenosis, 50-60% mid-vessel stenosis.   3. LCx: mild luminal irregularities.   4. RCA: small nondominant vessel,  95% diffuse mid-vessel disease.   5. 0/2 patent saphenous vein grafts, LIMA injected: not utilized on prior  bypass.     Assessment/Plan   Very pleasant 84 y.o. male with history of CAD, CHF, GERD, HTN, dyslipidemia, CABG x 3? SVG to RCA. (2000), original mechanical AVR (2010) on Coumadin with multiple admissions in 2018 for GI bleeding requiring multiple blood products complicated by APRYL. Patient underwent redo AVR on 1/8/2019 with MagnaEase valve, s/p PPM on 2/29/24 for symptomatic bradycardia/2:1 AVB.  Overall feeling much better after pacemaker placement, doing well from a cardiac standpoint.     Plan:  -Continue current aspirin 81 mg daily, atorvastatin 10 mg daily, Imdur, losartan, torsemide  -Follow-up in 6 months or sooner if needed      Sukhdev Garcia MD

## 2024-12-19 DIAGNOSIS — F51.01 PRIMARY INSOMNIA: ICD-10-CM

## 2024-12-19 RX ORDER — RAMELTEON 8 MG/1
8 TABLET ORAL NIGHTLY PRN
Qty: 90 TABLET | Refills: 0 | Status: SHIPPED | OUTPATIENT
Start: 2024-12-19

## 2024-12-27 DIAGNOSIS — R09.82 POSTNASAL DRIP: ICD-10-CM

## 2024-12-27 RX ORDER — FLUTICASONE PROPIONATE 50 MCG
SPRAY, SUSPENSION (ML) NASAL
Qty: 16 G | Refills: 0 | Status: SHIPPED | OUTPATIENT
Start: 2024-12-27

## 2024-12-28 DIAGNOSIS — I10 BENIGN ESSENTIAL HYPERTENSION: ICD-10-CM

## 2024-12-30 RX ORDER — ISOSORBIDE MONONITRATE 30 MG/1
30 TABLET, EXTENDED RELEASE ORAL DAILY
Qty: 90 TABLET | Refills: 3 | Status: SHIPPED | OUTPATIENT
Start: 2024-12-30

## 2025-01-04 DIAGNOSIS — E78.00 HYPERCHOLESTEROLEMIA: ICD-10-CM

## 2025-01-04 DIAGNOSIS — N40.1 BPH WITH URINARY OBSTRUCTION: ICD-10-CM

## 2025-01-04 DIAGNOSIS — N13.8 BPH WITH URINARY OBSTRUCTION: ICD-10-CM

## 2025-01-06 RX ORDER — TAMSULOSIN HYDROCHLORIDE 0.4 MG/1
CAPSULE ORAL
Qty: 90 CAPSULE | Refills: 0 | Status: SHIPPED | OUTPATIENT
Start: 2025-01-06

## 2025-01-06 RX ORDER — ATORVASTATIN CALCIUM 10 MG/1
10 TABLET, FILM COATED ORAL DAILY
Qty: 90 TABLET | Refills: 0 | Status: SHIPPED | OUTPATIENT
Start: 2025-01-06

## 2025-02-01 DIAGNOSIS — N40.1 BPH WITH URINARY OBSTRUCTION: ICD-10-CM

## 2025-02-01 DIAGNOSIS — R09.82 POSTNASAL DRIP: ICD-10-CM

## 2025-02-01 DIAGNOSIS — N13.8 BPH WITH URINARY OBSTRUCTION: ICD-10-CM

## 2025-02-03 RX ORDER — FLUTICASONE PROPIONATE 50 MCG
SPRAY, SUSPENSION (ML) NASAL
Qty: 16 G | Refills: 0 | Status: SHIPPED | OUTPATIENT
Start: 2025-02-03

## 2025-02-03 RX ORDER — FINASTERIDE 5 MG/1
5 TABLET, FILM COATED ORAL DAILY
Qty: 90 TABLET | Refills: 0 | Status: SHIPPED | OUTPATIENT
Start: 2025-02-03

## 2025-03-04 DIAGNOSIS — R09.82 POSTNASAL DRIP: ICD-10-CM

## 2025-03-05 RX ORDER — FLUTICASONE PROPIONATE 50 MCG
SPRAY, SUSPENSION (ML) NASAL
Qty: 16 G | Refills: 0 | Status: SHIPPED | OUTPATIENT
Start: 2025-03-05

## 2025-03-24 ENCOUNTER — HOSPITAL ENCOUNTER (OUTPATIENT)
Dept: CARDIOLOGY | Facility: CLINIC | Age: 85
Discharge: HOME | End: 2025-03-24
Payer: MEDICARE

## 2025-03-24 DIAGNOSIS — Z95.0 PRESENCE OF CARDIAC PACEMAKER: ICD-10-CM

## 2025-03-24 DIAGNOSIS — I49.5 SICK SINUS SYNDROME (MULTI): ICD-10-CM

## 2025-03-24 PROCEDURE — 93296 REM INTERROG EVL PM/IDS: CPT

## 2025-03-27 DIAGNOSIS — F51.01 PRIMARY INSOMNIA: ICD-10-CM

## 2025-03-27 RX ORDER — RAMELTEON 8 MG/1
8 TABLET ORAL NIGHTLY PRN
Qty: 90 TABLET | Refills: 0 | Status: SHIPPED | OUTPATIENT
Start: 2025-03-27

## 2025-03-29 DIAGNOSIS — N13.8 BPH WITH URINARY OBSTRUCTION: ICD-10-CM

## 2025-03-29 DIAGNOSIS — N40.1 BPH WITH URINARY OBSTRUCTION: ICD-10-CM

## 2025-03-29 DIAGNOSIS — R09.82 POSTNASAL DRIP: ICD-10-CM

## 2025-03-31 PROBLEM — Z95.0 PACEMAKER: Status: ACTIVE | Noted: 2025-03-31

## 2025-03-31 RX ORDER — FLUTICASONE PROPIONATE 50 MCG
SPRAY, SUSPENSION (ML) NASAL
Qty: 16 G | Refills: 0 | Status: SHIPPED | OUTPATIENT
Start: 2025-03-31

## 2025-03-31 RX ORDER — TAMSULOSIN HYDROCHLORIDE 0.4 MG/1
CAPSULE ORAL
Qty: 90 CAPSULE | Refills: 0 | Status: SHIPPED | OUTPATIENT
Start: 2025-03-31

## 2025-04-06 DIAGNOSIS — E78.00 HYPERCHOLESTEROLEMIA: ICD-10-CM

## 2025-04-07 RX ORDER — ATORVASTATIN CALCIUM 10 MG/1
10 TABLET, FILM COATED ORAL DAILY
Qty: 90 TABLET | Refills: 0 | Status: SHIPPED | OUTPATIENT
Start: 2025-04-07

## 2025-05-03 DIAGNOSIS — N40.1 BPH WITH URINARY OBSTRUCTION: ICD-10-CM

## 2025-05-03 DIAGNOSIS — R09.82 POSTNASAL DRIP: ICD-10-CM

## 2025-05-03 DIAGNOSIS — N13.8 BPH WITH URINARY OBSTRUCTION: ICD-10-CM

## 2025-05-05 RX ORDER — FLUTICASONE PROPIONATE 50 MCG
SPRAY, SUSPENSION (ML) NASAL
Qty: 16 G | Refills: 0 | Status: SHIPPED | OUTPATIENT
Start: 2025-05-05

## 2025-05-05 RX ORDER — FINASTERIDE 5 MG/1
5 TABLET, FILM COATED ORAL DAILY
Qty: 90 TABLET | Refills: 0 | Status: SHIPPED | OUTPATIENT
Start: 2025-05-05

## 2025-05-09 ENCOUNTER — APPOINTMENT (OUTPATIENT)
Dept: PRIMARY CARE | Facility: CLINIC | Age: 85
End: 2025-05-09
Payer: MEDICARE

## 2025-05-09 VITALS
BODY MASS INDEX: 31.18 KG/M2 | DIASTOLIC BLOOD PRESSURE: 72 MMHG | HEIGHT: 66 IN | TEMPERATURE: 97.3 F | SYSTOLIC BLOOD PRESSURE: 130 MMHG | WEIGHT: 194 LBS

## 2025-05-09 DIAGNOSIS — Z00.00 ROUTINE GENERAL MEDICAL EXAMINATION AT HEALTH CARE FACILITY: Primary | ICD-10-CM

## 2025-05-09 DIAGNOSIS — I50.9 CONGESTIVE HEART FAILURE, UNSPECIFIED HF CHRONICITY, UNSPECIFIED HEART FAILURE TYPE: ICD-10-CM

## 2025-05-09 DIAGNOSIS — K21.9 GASTROESOPHAGEAL REFLUX DISEASE WITHOUT ESOPHAGITIS: ICD-10-CM

## 2025-05-09 DIAGNOSIS — I48.0 PAROXYSMAL ATRIAL FIBRILLATION (MULTI): ICD-10-CM

## 2025-05-09 DIAGNOSIS — E78.2 MIXED HYPERLIPIDEMIA: ICD-10-CM

## 2025-05-09 DIAGNOSIS — Z13.1 DIABETES MELLITUS SCREENING: ICD-10-CM

## 2025-05-09 DIAGNOSIS — J41.0 SIMPLE CHRONIC BRONCHITIS (MULTI): ICD-10-CM

## 2025-05-09 DIAGNOSIS — N18.31 CHRONIC KIDNEY DISEASE, STAGE 3A (MULTI): ICD-10-CM

## 2025-05-09 DIAGNOSIS — F51.01 PRIMARY INSOMNIA: ICD-10-CM

## 2025-05-09 DIAGNOSIS — D50.0 IRON DEFICIENCY ANEMIA DUE TO CHRONIC BLOOD LOSS: ICD-10-CM

## 2025-05-09 DIAGNOSIS — I49.5 SYNCOPE DUE TO SICK SINUS SYNDROME (MULTI): ICD-10-CM

## 2025-05-09 DIAGNOSIS — R55 SYNCOPE DUE TO SICK SINUS SYNDROME (MULTI): ICD-10-CM

## 2025-05-09 DIAGNOSIS — R60.0 BILATERAL LEG EDEMA: ICD-10-CM

## 2025-05-09 DIAGNOSIS — I10 BENIGN ESSENTIAL HYPERTENSION: ICD-10-CM

## 2025-05-09 PROBLEM — N17.9 AKI (ACUTE KIDNEY INJURY): Status: RESOLVED | Noted: 2023-04-17 | Resolved: 2025-05-09

## 2025-05-09 LAB
BASOPHILS # BLD AUTO: 0.03 X10*3/UL (ref 0.1–1.6)
BASOPHILS NFR BLD AUTO: 0.58 % (ref 0–0.3)
CHOLEST SERPL-MCNC: 156 MG/DL (ref 0–199)
CHOLESTEROL/HDL RATIO: 6.2 (ref 4.2–7)
EOSINOPHIL # BLD AUTO: 0.11 X10*3/UL (ref 0.04–0.5)
EOSINOPHIL NFR BLD AUTO: 1.97 % (ref 0.7–7)
ERYTHROCYTE [DISTWIDTH] IN BLOOD BY AUTOMATED COUNT: 13.8 % (ref 11.5–14.5)
HBA1C MFR BLD: 6.2 %
HCT VFR BLD AUTO: 41.5 % (ref 38.4–51.3)
HDLC SERPL-MCNC: 25 MG/DL (ref 40–59)
HGB BLD-MCNC: 14.6 G/DL (ref 12.7–17)
IS PATIENT FASTING: YES
LDLC SERPL DIRECT ASSAY-MCNC: 103 MG/DL (ref 0–100)
LYMPHOCYTES # BLD AUTO: 1.02 X10*3/UL (ref 0–6)
LYMPHOCYTES NFR BLD AUTO: 18.15 % (ref 20.5–51.1)
MCH RBC QN AUTO: 30.1 PG (ref 26–32)
MCHC RBC AUTO-ENTMCNC: 35.2 G/DL (ref 31–38)
MCV RBC AUTO: 85.5 FL (ref 80–96)
MONOCYTES # BLD AUTO: 0.77 X10*3/UL (ref 1.6–24.9)
MONOCYTES NFR BLD AUTO: 13.58 % (ref 1.7–9.3)
NEUTROPHILS # BLD AUTO: 3.71 X10*3/UL (ref 1.4–6.5)
NEUTROPHILS NFR BLD AUTO: 65.72 % (ref 42.2–75.2)
PLATELET # BLD AUTO: 168.3 X10*3/UL (ref 150–450)
PMV BLD AUTO: 8.74 FL (ref 7.8–11)
RBC # BLD AUTO: 4.85 X10*6/UL (ref 4.1–5.6)
TRIGL SERPL-MCNC: 171 MG/DL
TSH SERPL-ACNC: 3.11 MIU/L (ref 0.44–3.98)
WBC # BLD AUTO: 5.64 X10*3/UL (ref 4.5–10.5)

## 2025-05-09 PROCEDURE — 83036 HEMOGLOBIN GLYCOSYLATED A1C: CPT | Performed by: PHYSICIAN ASSISTANT

## 2025-05-09 PROCEDURE — 3075F SYST BP GE 130 - 139MM HG: CPT | Performed by: PHYSICIAN ASSISTANT

## 2025-05-09 PROCEDURE — 1159F MED LIST DOCD IN RCRD: CPT | Performed by: PHYSICIAN ASSISTANT

## 2025-05-09 PROCEDURE — 1158F ADVNC CARE PLAN TLK DOCD: CPT | Performed by: PHYSICIAN ASSISTANT

## 2025-05-09 PROCEDURE — 1036F TOBACCO NON-USER: CPT | Performed by: PHYSICIAN ASSISTANT

## 2025-05-09 PROCEDURE — 1126F AMNT PAIN NOTED NONE PRSNT: CPT | Performed by: PHYSICIAN ASSISTANT

## 2025-05-09 PROCEDURE — 80061 LIPID PANEL: CPT | Performed by: PHYSICIAN ASSISTANT

## 2025-05-09 PROCEDURE — 1157F ADVNC CARE PLAN IN RCRD: CPT | Performed by: PHYSICIAN ASSISTANT

## 2025-05-09 PROCEDURE — 84443 ASSAY THYROID STIM HORMONE: CPT | Performed by: PHYSICIAN ASSISTANT

## 2025-05-09 PROCEDURE — 85025 COMPLETE CBC W/AUTO DIFF WBC: CPT | Performed by: PHYSICIAN ASSISTANT

## 2025-05-09 PROCEDURE — 3078F DIAST BP <80 MM HG: CPT | Performed by: PHYSICIAN ASSISTANT

## 2025-05-09 PROCEDURE — 1170F FXNL STATUS ASSESSED: CPT | Performed by: PHYSICIAN ASSISTANT

## 2025-05-09 PROCEDURE — 99214 OFFICE O/P EST MOD 30 MIN: CPT | Performed by: PHYSICIAN ASSISTANT

## 2025-05-09 PROCEDURE — G0439 PPPS, SUBSEQ VISIT: HCPCS | Performed by: PHYSICIAN ASSISTANT

## 2025-05-09 PROCEDURE — 1123F ACP DISCUSS/DSCN MKR DOCD: CPT | Performed by: PHYSICIAN ASSISTANT

## 2025-05-09 PROCEDURE — 1160F RVW MEDS BY RX/DR IN RCRD: CPT | Performed by: PHYSICIAN ASSISTANT

## 2025-05-09 ASSESSMENT — ENCOUNTER SYMPTOMS
POLYPHAGIA: 0
FEVER: 0
CHILLS: 0
COLOR CHANGE: 0
WEAKNESS: 0
EYE PAIN: 0
NUMBNESS: 0
EYE DISCHARGE: 0
FATIGUE: 0
NERVOUS/ANXIOUS: 0
OCCASIONAL FEELINGS OF UNSTEADINESS: 0
CHEST TIGHTNESS: 0
DIARRHEA: 0
ANAL BLEEDING: 0
CHOKING: 0
ARTHRALGIAS: 0
CONFUSION: 0
TREMORS: 0
DEPRESSION: 0
SLEEP DISTURBANCE: 0
FREQUENCY: 0
ABDOMINAL PAIN: 0
SORE THROAT: 0
CONSTIPATION: 0
LOSS OF SENSATION IN FEET: 0
DIZZINESS: 0
COUGH: 0
MYALGIAS: 0
FACIAL SWELLING: 0
POLYDIPSIA: 0
ABDOMINAL DISTENTION: 0
NAUSEA: 0
JOINT SWELLING: 0
HEMATURIA: 0
WHEEZING: 0
SHORTNESS OF BREATH: 0
PALPITATIONS: 0
DIFFICULTY URINATING: 0
HEADACHES: 0
APPETITE CHANGE: 0
VOMITING: 0

## 2025-05-09 ASSESSMENT — GERIATRIC MINI NUTRITIONAL ASSESSMENT (MNA)
E NEUROPSYCHOLOGICAL PROBLEMS: NO PSYCHOLOGICAL PROBLEMS
C GENERAL MOBILITY: GOES OUT
D HAS SUFFERED PSYCHOLOGICAL STRESS OR ACUTE DISEASE IN THE PAST 3 MONTHS?: NO
B WEIGHT LOSS DURING THE LAST 3 MONTHS: NO WEIGHT LOSS
A HAS FOOD INTAKE DECLINED OVER THE PAST 3 MONTHS DUE TO LOSS OF APPETITE, DIGESTIVE PROBLEMS, CHEWING OR SWALLOWING DIFFICULTIES?: NO DECREASE IN FOOD INTAKE

## 2025-05-09 ASSESSMENT — ACTIVITIES OF DAILY LIVING (ADL)
JUDGMENT_ADEQUATE_SAFELY_COMPLETE_DAILY_ACTIVITIES: YES
EATING: INDEPENDENT
GROCERY SHOPPING: INDEPENDENT
DRESSING YOURSELF: INDEPENDENT
MANAGING FINANCES: INDEPENDENT
USING TRANSPORTATION: INDEPENDENT
BATHING: INDEPENDENT
ADEQUATE_TO_COMPLETE_ADL: YES
USING TELEPHONE: INDEPENDENT
PREPARING MEALS: INDEPENDENT
HEARING - LEFT EAR: FUNCTIONAL
FEEDING YOURSELF: INDEPENDENT
GROOMING: INDEPENDENT
DOING HOUSEWORK: INDEPENDENT
STIL DRIVING: NO
HEARING - RIGHT EAR: FUNCTIONAL
TOILETING: INDEPENDENT
NEEDS ASSISTANCE WITH FOOD: INDEPENDENT
PILL BOX USED: YES
PATIENT'S MEMORY ADEQUATE TO SAFELY COMPLETE DAILY ACTIVITIES?: YES
TAKING MEDICATION: INDEPENDENT
WALKS IN HOME: INDEPENDENT

## 2025-05-09 ASSESSMENT — PAIN SCALES - GENERAL: PAINLEVEL_OUTOF10: 0-NO PAIN

## 2025-05-09 NOTE — PROGRESS NOTES
"Subjective   Patient ID: Nahum Steward is a 85 y.o. male  with a history of history of CABG in 2000, coronary artery bypass graft surgery with saphenous vein graft to right coronary artery, replacement of mechanical valve with tissue valve in 2019, hypertension, CHF, insomnia, hyperlipidemia, APRYL who presents for Medicare Annual Wellness Visit Initial.    HPI the patient is presented for follow-up.  He takes all his medications as prescribed. His blood pressure is well-controlled with current regimen.  He denies shortness of breath, chest pain or leg edema.  He exercises daily by bicycling 30 minutes.  He is also physically active.  He denies depression, anxiety or stress.    Review of Systems   Constitutional:  Negative for appetite change, chills, fatigue and fever.   HENT:  Negative for congestion, ear pain, facial swelling, hearing loss, nosebleeds and sore throat.    Eyes:  Negative for pain, discharge and visual disturbance.   Respiratory:  Negative for cough, choking, chest tightness, shortness of breath and wheezing.    Cardiovascular:  Negative for chest pain, palpitations and leg swelling.   Gastrointestinal:  Negative for abdominal distention, abdominal pain, anal bleeding, constipation, diarrhea, nausea and vomiting.   Endocrine: Negative for cold intolerance, heat intolerance, polydipsia, polyphagia and polyuria.   Genitourinary:  Negative for difficulty urinating, frequency, hematuria and urgency.   Musculoskeletal:  Negative for arthralgias, gait problem, joint swelling and myalgias.   Skin:  Negative for color change and rash.   Neurological:  Negative for dizziness, tremors, syncope, weakness, numbness and headaches.   Psychiatric/Behavioral:  Negative for behavioral problems, confusion, sleep disturbance and suicidal ideas. The patient is not nervous/anxious.        Objective   /72 (Patient Position: Sitting)   Temp 36.3 °C (97.3 °F) (Temporal)   Ht 1.676 m (5' 6\")   Wt 88 kg (194 lb)   " BMI 31.31 kg/m²     Physical Exam  Constitutional:       General: He is not in acute distress.     Appearance: Normal appearance.   HENT:      Head: Normocephalic and atraumatic.      Nose: Nose normal.   Eyes:      Extraocular Movements: Extraocular movements intact.      Conjunctiva/sclera: Conjunctivae normal.      Pupils: Pupils are equal, round, and reactive to light.   Cardiovascular:      Rate and Rhythm: Normal rate and regular rhythm.      Pulses: Normal pulses.      Heart sounds: Normal heart sounds.   Pulmonary:      Effort: Pulmonary effort is normal.      Breath sounds: Normal breath sounds.   Abdominal:      General: Bowel sounds are normal.      Palpations: Abdomen is soft.   Musculoskeletal:         General: Normal range of motion.      Cervical back: Normal range of motion and neck supple.   Neurological:      General: No focal deficit present.      Mental Status: He is alert and oriented to person, place, and time.   Psychiatric:         Mood and Affect: Mood normal.         Behavior: Behavior normal.         Thought Content: Thought content normal.         Judgment: Judgment normal.         Assessment/Plan     Wellness exam  Colonoscopy done in 2018 repeat in 5 years, patient declined since last colonoscopy was normal  Pneumovax 9/2/2022  Flu vaccine declined  COVID-vaccine 3/29/2022  See dentist twice a year  Yearly eye exam  see dermatology once a year for a skin check.    Bradycardia  S/p pacemaker   stable  Follow-up with cardiology Dr. Garcia    CHF  Stable  Continue torsemide 20 mg twice daily  Continue losartan 25 mg daily  Follow-up with cardiology Dr. Garcia as scheduled     CAD  S/p CABG in 2010 and mechanical aortic valve placement,   S/p AVR in Jan 2019     Stable   Continue blood pressure medications  Continue aspirin 81 mg daily  Continue atorvastatin 10 mg daily  Follow-up with cardiology as scheduled     HTN  Well controlled   Continue furosemide 40 mg daily  Continue losartan 25  mg daily  Monitor blood pressure daily  No added salt diet, do not add salt to your food  Try to exercise every other day for 30 minutes    BPH  Continue finasteride 5 mg daily  Continue tamsulosin 0.4 mg once daily  Follow-up with urology Dr. Meehan     Anemia   Improved  Continue oral iron supplement twice daily  Follow-up with hematology Dr. Huitron     Leg edema  Stable  Elevate feet  Wear compression stockings  Continue torsemide 20 mg daily     COPD  Stable  Continue albuterol as needed  follow up with pulmonology      Dyslipidemia  Continue atorvastatin 10 mg daily  Continue omega 3 daily 1000 mg daily  Continue with the low fat, low cholesterol diet  I recommend Mediterranean diet, which include fish, chicken, vegetables and olive oil  Exercise daily for 30 minutes at least 3 times a week    Acid reflux  Avoid caffeine and alcoholic beverages  Avoid spicy and acidic food, such as tomato and citrus fruits  Avoid onions, peppermint and spearmint   Eat small, frequent meals  Do not eat late at night, at least 3 hours before bed  Raise the head of the bed 6-8 inches for sleeping  Wear lose-fitting clothes around your waist   Continue famotidine 20 mg twice daily before meals     Insomnia  Continue ramelteon 8 mg at bedtime    Body mass index is 31.31 kg/m².  Low fat, low cholesterol diet, low carbohydrate diet  Exercise at least 30 minutes daily    Follow-up in 3 months

## 2025-05-09 NOTE — PROGRESS NOTES
"Subjective   Reason for Visit: Nahum Steward is an 85 y.o. male here for a Medicare Wellness visit.     Past Medical, Surgical, and Family History reviewed and updated in chart.    Reviewed all medications by prescribing practitioner or clinical pharmacist (such as prescriptions, OTCs, herbal therapies and supplements) and documented in the medical record.    HPI    Patient Care Team:  Kim Dill PA-C as PCP - General (Internal Medicine)  Viktor Mendoza MD as PCP - [employer] PCP  Jonh Huitron MD as Medical Oncologist (Hematology and Oncology)     Review of Systems    Objective   Vitals:  /72 (Patient Position: Sitting)   Temp 36.3 °C (97.3 °F) (Temporal)   Ht 1.676 m (5' 6\")   Wt 88 kg (194 lb)   BMI 31.31 kg/m²       Physical Exam    Assessment & Plan  Routine general medical examination at health care facility    Orders:    1 Year Follow Up In Primary Care - Wellness Exam    1 Year Follow Up In Primary Care - Wellness Exam; Future    Mixed hyperlipidemia    Orders:    Lipid Panel    Iron deficiency anemia due to chronic blood loss         Benign essential hypertension    Orders:    CBC w/5 Part Differential, [facility]    Thyroid Stimulating Hormone    Diabetes mellitus screening    Orders:    Hemoglobin A1C    Congestive heart failure, unspecified HF chronicity, unspecified heart failure type         Simple chronic bronchitis (Multi)         Paroxysmal atrial fibrillation (Multi)         Syncope due to sick sinus syndrome (Multi)         Chronic kidney disease, stage 3a (Multi)         Bilateral leg edema         Gastroesophageal reflux disease without esophagitis         Primary insomnia                   "

## 2025-05-12 ENCOUNTER — TELEPHONE (OUTPATIENT)
Dept: PRIMARY CARE | Facility: CLINIC | Age: 85
End: 2025-05-12
Payer: MEDICARE

## 2025-05-12 NOTE — TELEPHONE ENCOUNTER
----- Message from Kim Dill sent at 5/9/2025  5:00 PM EDT -----  Please call him with blood test results.  He has prediabetes.  He needs to start low-carb diet.  Please ask him to cut down on sweets that he likes.  The rest of the blood work is stable.  ----- Message -----  From: Lab, Background User  Sent: 5/9/2025  12:54 PM EDT  To: Kim Dill PA-C

## 2025-06-04 DIAGNOSIS — R09.82 POSTNASAL DRIP: ICD-10-CM

## 2025-06-05 ENCOUNTER — OFFICE VISIT (OUTPATIENT)
Dept: CARDIOLOGY | Facility: HOSPITAL | Age: 85
End: 2025-06-05
Payer: MEDICARE

## 2025-06-05 VITALS
SYSTOLIC BLOOD PRESSURE: 176 MMHG | DIASTOLIC BLOOD PRESSURE: 79 MMHG | BODY MASS INDEX: 32.45 KG/M2 | HEART RATE: 67 BPM | WEIGHT: 201.06 LBS | OXYGEN SATURATION: 93 %

## 2025-06-05 DIAGNOSIS — Z95.2 H/O AORTIC VALVE REPLACEMENT: ICD-10-CM

## 2025-06-05 DIAGNOSIS — I10 BENIGN ESSENTIAL HYPERTENSION: Primary | ICD-10-CM

## 2025-06-05 DIAGNOSIS — Z95.1 S/P CABG X 3: ICD-10-CM

## 2025-06-05 DIAGNOSIS — Z95.0 PACEMAKER: ICD-10-CM

## 2025-06-05 LAB
ATRIAL RATE: 68 BPM
P AXIS: 109 DEGREES
P OFFSET: 131 MS
P ONSET: 104 MS
PR INTERVAL: 166 MS
Q ONSET: 187 MS
QRS COUNT: 12 BEATS
QRS DURATION: 174 MS
QT INTERVAL: 472 MS
QTC CALCULATION(BAZETT): 501 MS
QTC FREDERICIA: 492 MS
R AXIS: -89 DEGREES
T AXIS: 71 DEGREES
T OFFSET: 423 MS
VENTRICULAR RATE: 68 BPM

## 2025-06-05 PROCEDURE — 3077F SYST BP >= 140 MM HG: CPT | Performed by: INTERNAL MEDICINE

## 2025-06-05 PROCEDURE — 99214 OFFICE O/P EST MOD 30 MIN: CPT | Performed by: INTERNAL MEDICINE

## 2025-06-05 PROCEDURE — 93005 ELECTROCARDIOGRAM TRACING: CPT | Performed by: INTERNAL MEDICINE

## 2025-06-05 PROCEDURE — 3078F DIAST BP <80 MM HG: CPT | Performed by: INTERNAL MEDICINE

## 2025-06-05 PROCEDURE — 1159F MED LIST DOCD IN RCRD: CPT | Performed by: INTERNAL MEDICINE

## 2025-06-05 PROCEDURE — G2211 COMPLEX E/M VISIT ADD ON: HCPCS | Performed by: INTERNAL MEDICINE

## 2025-06-05 PROCEDURE — 99212 OFFICE O/P EST SF 10 MIN: CPT

## 2025-06-05 NOTE — PATIENT INSTRUCTIONS
Increase losartan to 50mg daily-- I will call in script.  You can take 2 of the losartan 25mg together until  the 50mg pill.

## 2025-06-05 NOTE — PROGRESS NOTES
Chief Complaint:   No chief complaint on file.     History Of Present Illness:    Nahum Steward is a 85 y.o. male presenting with ***.     Last Recorded Vitals:  Vitals:    06/05/25 0946   BP: 176/79   Pulse: 67   SpO2: 93%   Weight: 91.2 kg (201 lb 1 oz)       Past Medical History:  He has a past medical history of APRYL (acute kidney injury) (04/17/2023), CHF (congestive heart failure), Coronary artery disease, Hyperlipidemia, and Hypertension.    Past Surgical History:  He has a past surgical history that includes Colonoscopy (10/02/2018); Prostate surgery (10/02/2018); Coronary artery bypass graft (02/18/2019); Aortic valve replacement (02/18/2019); Other surgical history (02/18/2019); CT angio abdomen pelvis w and or wo IV IV contrast (11/26/2018); IR angiogram inferior epigastric (11/29/2018); IR angiogram inferior epigastric pelvic (11/29/2018); IR angiogram inferior epigastric pelvic (11/29/2018); IR embolization lymph node (Bilateral, 11/29/2018); IR angiogram inferior epigastric pelvic (11/29/2018); CT angio abdomen pelvis w and or wo IV IV contrast (11/12/2018); and Cardiac electrophysiology procedure (N/A, 2/29/2024).      Social History:  He reports that he quit smoking about 42 years ago. His smoking use included cigarettes. He started smoking about 65 years ago. He has a 46 pack-year smoking history. He has never used smokeless tobacco. He reports that he does not drink alcohol and does not use drugs.    Family History:  Family History[1]     Allergies:  Bee venom protein (honey bee) and Doxepin    Outpatient Medications:  Current Outpatient Medications   Medication Instructions    acetaminophen (Tylenol) 325 mg tablet Every 6 hours PRN    ascorbic acid (VITAMIN C) 1,000 mg, Every 24 hours    aspirin 81 mg EC tablet 1 tablet, Daily    atorvastatin (LIPITOR) 10 mg, oral, Daily    cholecalciferol, vitamin D3, (VITAMIN D3 ORAL) 1 capsule, Daily (0630)    famotidine (Pepcid) 20 mg tablet 1 tablet, Daily     ferrous sulfate 325 (65 Fe) MG tablet 1 tablet, 2 times daily    finasteride (PROSCAR) 5 mg, oral, Daily    fish,bora,flax oils-om3,6,9no1 (Omega 3-6-9) 1,200 mg capsule Take by mouth.    fluticasone (Flonase) 50 mcg/actuation nasal spray USE 2 SPRAYS IN EACH NOSTRIL ONCE DAILY AT BEDTIME.  SHAKE GENTLY.  BEFORE USE, PRIME PUMP.  AFTER USE, CLEAN TIP AND REPLACE CAP.    isosorbide mononitrate ER (IMDUR) 30 mg, oral, Daily    losartan (Cozaar) 25 mg tablet 1 tablet, Daily    magnesium oxide/magnesium (MAGNESIUM OXIDE-MG AA CHELATE ORAL) 1 tablet, 2 times daily    MULTIVIT 33-MTFOLATE-NAC-CHROM ORAL Take by mouth.    ramelteon (ROZEREM) 8 mg, oral, Nightly PRN    tamsulosin (Flomax) 0.4 mg 24 hr capsule Take 1 capsule by mouth once daily    torsemide (DEMADEX) 20 mg, Daily    Vitamin D3 50 mcg (2,000 unit) capsule oral, Daily       Physical Exam:  ***     Last Labs:  CBC -  Lab Results   Component Value Date    WBC 5.64 05/09/2025    HGB 14.60 05/09/2025    HCT 41.5 05/09/2025    MCV 85.5 05/09/2025    .3 05/09/2025       CMP -  Lab Results   Component Value Date    CALCIUM 9.0 10/28/2024    PHOS 3.1 10/28/2024    PROT 6.5 10/28/2024    ALBUMIN 3.8 10/28/2024    AST 16 10/28/2024    ALT 16 10/28/2024    ALKPHOS 58 10/28/2024    BILITOT 0.5 10/28/2024       LIPID PANEL -   Lab Results   Component Value Date    CHOL 156 05/09/2025    TRIG 171 (H) 05/09/2025    HDL 25.0 (L) 05/09/2025    CHHDL 6.2 05/09/2025    VLDL 32 04/27/2021    NHDL 102 01/08/2020       RENAL FUNCTION PANEL -   Lab Results   Component Value Date    GLUCOSE 113 (H) 10/28/2024     10/28/2024    K 4.1 10/28/2024     10/28/2024    CO2 34 (H) 10/28/2024    ANIONGAP 12 10/28/2024    BUN 20 10/28/2024    CREATININE 1.64 (H) 10/28/2024    GFRMALE CANCELED 09/22/2023    CALCIUM 9.0 10/28/2024    PHOS 3.1 10/28/2024    ALBUMIN 3.8 10/28/2024        Lab Results   Component Value Date    BNP 96 03/16/2020    HGBA1C 6.2 (H) 05/09/2025        Last Cardiology Tests:  ECG independently reviewed from today: v-paced     Echo 2/2024:   1. Left ventricular systolic function is normal with a 60-65% estimated ejection fraction.   2. Spectral Doppler shows an impaired relaxation pattern of left ventricular diastolic filling.   3. There is a bioprosthetic aortic valve.   4. There is trace-mild mitral, tricuspid and pulmonic regurgitation.        Stress test 2/15/2019   1. The blunted heart rate diminishes the sensitivity of this test.   2. Wenckebach (Mobitz 1 - 2nd degree AV block) during recovery.     Intraoperative ALBERTO 1/8/2019   1. The left ventricular systolic function is normal.   2. Moderately increased left ventricular septal thickness.   3. The right atrium is severely dilated.   4. No left atrial thrombus.   5. There is no evidence of a patent foramen ovale.     Cath 12/31/18:   1. Left main: no significant angiographic disease.   2. LAD: 30% ostial stenosis, 50-60% mid-vessel stenosis.   3. LCx: mild luminal irregularities.   4. RCA: small nondominant vessel, 95% diffuse mid-vessel disease.   5. 0/2 patent saphenous vein grafts, LIMA injected: not utilized on prior  bypass.    Assessment/Plan   Very pleasant 84 y.o. male with history of CAD, CHF, GERD, HTN, dyslipidemia, CABG x 3? SVG to RCA. (2000), original mechanical AVR (2010) on Coumadin with multiple admissions in 2018 for GI bleeding requiring multiple blood products complicated by APRYL. Patient underwent redo AVR on 1/8/2019 with MagnaEase valve, s/p PPM on 2/29/24 for symptomatic bradycardia/2:1 AVB.  Overall feeling much better after pacemaker placement, doing well from a cardiac standpoint.     Plan:  -Continue current aspirin 81 mg daily, atorvastatin 10 mg daily, Imdur, losartan, torsemide  -Follow-up in 6 months or sooner if needed      Sukhdev Garcia MD       [1]   Family History  Problem Relation Name Age of Onset    Liver cancer Mother      Alzheimer's disease Sister      Heart  attack Brother

## 2025-06-09 DIAGNOSIS — R09.82 POSTNASAL DRIP: ICD-10-CM

## 2025-06-09 RX ORDER — FLUTICASONE PROPIONATE 50 MCG
SPRAY, SUSPENSION (ML) NASAL
Qty: 16 G | Refills: 2 | Status: SHIPPED | OUTPATIENT
Start: 2025-06-09 | End: 2025-06-09 | Stop reason: SDUPTHER

## 2025-06-09 RX ORDER — FLUTICASONE PROPIONATE 50 MCG
SPRAY, SUSPENSION (ML) NASAL
Qty: 16 G | Refills: 11 | Status: SHIPPED | OUTPATIENT
Start: 2025-06-09

## 2025-06-21 DIAGNOSIS — F51.01 PRIMARY INSOMNIA: ICD-10-CM

## 2025-06-23 ENCOUNTER — HOSPITAL ENCOUNTER (OUTPATIENT)
Dept: CARDIOLOGY | Facility: CLINIC | Age: 85
Discharge: HOME | End: 2025-06-23
Payer: MEDICARE

## 2025-06-23 DIAGNOSIS — I49.5 SICK SINUS SYNDROME (MULTI): ICD-10-CM

## 2025-06-23 DIAGNOSIS — Z95.0 PRESENCE OF CARDIAC PACEMAKER: ICD-10-CM

## 2025-06-23 PROBLEM — Z95.1 S/P CABG X 3: Status: ACTIVE | Noted: 2025-06-23

## 2025-06-23 PROCEDURE — 93294 REM INTERROG EVL PM/LDLS PM: CPT | Performed by: STUDENT IN AN ORGANIZED HEALTH CARE EDUCATION/TRAINING PROGRAM

## 2025-06-23 PROCEDURE — 93296 REM INTERROG EVL PM/IDS: CPT

## 2025-06-23 RX ORDER — RAMELTEON 8 MG/1
8 TABLET ORAL NIGHTLY PRN
Qty: 90 TABLET | Refills: 0 | Status: SHIPPED | OUTPATIENT
Start: 2025-06-23

## 2025-06-26 DIAGNOSIS — N13.8 BPH WITH URINARY OBSTRUCTION: ICD-10-CM

## 2025-06-26 DIAGNOSIS — N40.1 BPH WITH URINARY OBSTRUCTION: ICD-10-CM

## 2025-06-27 RX ORDER — TAMSULOSIN HYDROCHLORIDE 0.4 MG/1
0.4 CAPSULE ORAL DAILY
Qty: 90 CAPSULE | Refills: 0 | Status: SHIPPED | OUTPATIENT
Start: 2025-06-27

## 2025-06-30 ENCOUNTER — APPOINTMENT (OUTPATIENT)
Dept: PRIMARY CARE | Facility: CLINIC | Age: 85
End: 2025-06-30
Payer: MEDICARE

## 2025-06-30 ENCOUNTER — OFFICE VISIT (OUTPATIENT)
Dept: NEPHROLOGY | Facility: CLINIC | Age: 85
End: 2025-06-30
Payer: MEDICARE

## 2025-06-30 VITALS
HEART RATE: 67 BPM | SYSTOLIC BLOOD PRESSURE: 146 MMHG | TEMPERATURE: 98 F | WEIGHT: 191 LBS | DIASTOLIC BLOOD PRESSURE: 77 MMHG | BODY MASS INDEX: 30.83 KG/M2

## 2025-06-30 DIAGNOSIS — N18.32 CHRONIC KIDNEY DISEASE, STAGE 3B (MULTI): ICD-10-CM

## 2025-06-30 DIAGNOSIS — N13.8 BPH WITH URINARY OBSTRUCTION: ICD-10-CM

## 2025-06-30 DIAGNOSIS — I10 BENIGN ESSENTIAL HYPERTENSION: ICD-10-CM

## 2025-06-30 DIAGNOSIS — N40.1 BPH WITH URINARY OBSTRUCTION: ICD-10-CM

## 2025-06-30 DIAGNOSIS — N18.31 CHRONIC KIDNEY DISEASE, STAGE 3A (MULTI): Primary | ICD-10-CM

## 2025-06-30 PROCEDURE — 1159F MED LIST DOCD IN RCRD: CPT | Performed by: INTERNAL MEDICINE

## 2025-06-30 PROCEDURE — 3077F SYST BP >= 140 MM HG: CPT | Performed by: INTERNAL MEDICINE

## 2025-06-30 PROCEDURE — 1160F RVW MEDS BY RX/DR IN RCRD: CPT | Performed by: INTERNAL MEDICINE

## 2025-06-30 PROCEDURE — 3078F DIAST BP <80 MM HG: CPT | Performed by: INTERNAL MEDICINE

## 2025-06-30 PROCEDURE — 99214 OFFICE O/P EST MOD 30 MIN: CPT | Performed by: INTERNAL MEDICINE

## 2025-06-30 PROCEDURE — 1036F TOBACCO NON-USER: CPT | Performed by: INTERNAL MEDICINE

## 2025-06-30 RX ORDER — TETANUS TOXOID, REDUCED DIPHTHERIA TOXOID AND ACELLULAR PERTUSSIS VACCINE, ADSORBED 5; 2.5; 8; 8; 2.5 [IU]/.5ML; [IU]/.5ML; UG/.5ML; UG/.5ML; UG/.5ML
SUSPENSION INTRAMUSCULAR
COMMUNITY
Start: 2025-04-03

## 2025-06-30 NOTE — PROGRESS NOTES
Subjective   Nahum Steward is a 85 y.o. male who presented today to discuss his stage 3a chronic kidney disease in the setting of microvascular disease having had a bypass, and aortic valve replacement.  He first underwent a mechanical aortic valve replacement and bypass in 2000 and, was off of anticoagulation in 2014 at which time he suffered an embolic stroke.  He had an arteriovenous malformation clipping in 2013, again in November 2018.  He suffered a fall with a left tibia and fibula fracture in 2006, had a hip arthroplasty in 1990, has had prostate cancer status post procedures but I lacked details.  He then presented to [facility] with weakness, shortness of breath, was hypotensive.  He had large-volume hematemesis, required intubation.  He suffered hemorrhagic shock, high INR, had been on vancomycin, had contrast exposure.  Developed acute kidney injury.  He required 11 units of packed cells, 10 units of fresh frozen plasma, 2 packs of platelets, he became quite fluid overload.  A decision was made to forego dialysis and consider hospice.  But his daughter Adali, who is an emergency medicine physician, brought him home and brought him to [facility] where I met him.    He is feeling great now and denies nausea, vomiting, chest pain, shortness of breath, or abdominal pain.  He has had heart failure but has been much better with sodium, takes his diuretics and his weight is come down.  His weight may be up 2 pounds since I saw him in March.  Blood pressure averages in the low 130s systolic.    ROS  As in Subjective, all other ROS are negative    Objective     Vital signs    Visit Vitals  /77 (BP Location: Right arm, Patient Position: Sitting, BP Cuff Size: Adult)   Pulse 67   Temp 36.7 °C (98 °F) (Temporal)      Vitals:    06/30/25 0838   Weight: 86.6 kg (191 lb)        Physical Exam  Constitutional:       Appearance: Normal appearance.   HENT:      Mouth/Throat:      Mouth: Mucous  "membranes are moist.   Eyes:      Extraocular Movements: Extraocular movements intact.      Pupils: Pupils are equal, round, and reactive to light.   Cardiovascular:      Rate and Rhythm: Regular rhythm.      Heart sounds: S1 normal and S2 normal.   Pulmonary:      Breath sounds: Normal breath sounds.   Abdominal:      Comments: Soft, NT/ND, no masses, normal bowel sounds   Genitourinary:     Comments: No pichardo  Musculoskeletal:      No synovitis  Edema:     Right lower leg: No edema.      Left lower leg: No edema.   Skin:     General: Skin is warm and dry.   Neurological:      General: No focal deficit present.      Mental Status: She is alert and oriented to person, place, and time.   Psychiatric:         Behavior: Behavior normal.    Left upper chest pacemaker pocket noted    Meds  Current Medications[1]     Allergies  RX Allergies[2]     Results  Lab Results   Component Value Date    GLUCOSE 113 (H) 10/28/2024     10/28/2024    K 4.1 10/28/2024     10/28/2024    CO2 34 (H) 10/28/2024    ANIONGAP 12 10/28/2024    BUN 20 10/28/2024    CREATININE 1.64 (H) 10/28/2024    GFRF CANCELED 09/22/2023    CALCIUM 9.0 10/28/2024    MG 2.54 (H) 01/16/2019     Lab Results   Component Value Date    PTH 58.6 10/28/2024    CALCIUM 9.0 10/28/2024     No results found for: \"ALBUR\", \"[id number]\"         @LABALLVALUEIP(CREATININE:*)@  @LABALLVALUEIP(NA:*)@    Imaging results       Assessment and Plan  Nahum Steward has stable stage 3a chronic kidney disease having suffered acute kidney injury after hemorrhagic shock event with multisystem organ failure.  He also likely has microvascular disease having had the bypass and aortic valve replacement, then a porcine aortic valve replacement in January 2019.  I would like to see his weight come back down, blood pressure is at goal.  He did labs recently but they are somewhere in the [employer] system, I have asked for results.  Last October creatinine stable at 1.64 mg/dL, no " acidosis, electrolytes look good.  He had a normal CBC, he has lacked blood in the urine, a very small amount of albumin in the urine.  25 vitamin D and intact PTH have looked good.  He will continue with low-dose losartan.  He knows that he can use an additional torsemide if his sodium intake is high on any given day.  I reviewed his prior echocardiogram.  I also reviewed the last MR urogram which noted renal cysts.  No findings that would warrant a repeat study.       Problem List Items Addressed This Visit       Benign essential hypertension    Relevant Orders    CBC and Auto Differential    Renal Function Panel    Albumin-Creatinine Ratio, Urine Random    Creatinine, Urine Random    Protein, Urine Random    Urinalysis with Reflex Microscopic    Follow Up In Nephrology    BPH with urinary obstruction    Relevant Orders    CBC and Auto Differential    Renal Function Panel    Albumin-Creatinine Ratio, Urine Random    Creatinine, Urine Random    Protein, Urine Random    Urinalysis with Reflex Microscopic    Follow Up In Nephrology    Chronic kidney disease, stage 3a (Multi) - Primary    Relevant Orders    CBC and Auto Differential    Renal Function Panel    Albumin-Creatinine Ratio, Urine Random    Creatinine, Urine Random    Protein, Urine Random    Urinalysis with Reflex Microscopic    Follow Up In Nephrology      Geremias Licea MD           [1]   Current Outpatient Medications:     acetaminophen (Tylenol) 325 mg tablet, Take by mouth every 6 hours if needed., Disp: , Rfl:     ascorbic acid (Vitamin C) 500 mg tablet, Take 2 tablets (1,000 mg) by mouth once every 24 hours., Disp: , Rfl:     aspirin 81 mg EC tablet, Take 1 tablet (81 mg) by mouth once daily., Disp: , Rfl:     atorvastatin (Lipitor) 10 mg tablet, Take 1 tablet by mouth once daily, Disp: 90 tablet, Rfl: 0    Boostrix Tdap 2.5-8-5 Lf-mcg-Lf/0.5mL injection, , Disp: , Rfl:     cholecalciferol, vitamin D3, (VITAMIN D3 ORAL), Take 1 capsule by mouth  early in the morning.., Disp: , Rfl:     famotidine (Pepcid) 20 mg tablet, Take 1 tablet (20 mg) by mouth once daily., Disp: , Rfl:     ferrous sulfate 325 (65 Fe) MG tablet, Take 1 tablet by mouth 2 times a day., Disp: , Rfl:     finasteride (Proscar) 5 mg tablet, Take 1 tablet by mouth once daily, Disp: 90 tablet, Rfl: 0    fish,bora,flax oils-om3,6,9no1 (Omega 3-6-9) 1,200 mg capsule, Take by mouth., Disp: , Rfl:     fluticasone (Flonase) 50 mcg/actuation nasal spray, USE 2 SPRAYS IN EACH NOSTRIL ONCE DAILY AT BEDTIME.  SHAKE GENTLY.  BEFORE USE, PRIME PUMP.  AFTER USE, CLEAN TIP AND REPLACE CAP., Disp: 16 g, Rfl: 11    isosorbide mononitrate ER (Imdur) 30 mg 24 hr tablet, Take 1 tablet by mouth once daily, Disp: 90 tablet, Rfl: 3    losartan (Cozaar) 25 mg tablet, Take 1 tablet (25 mg) by mouth once daily., Disp: , Rfl:     magnesium oxide/magnesium (MAGNESIUM OXIDE-MG AA CHELATE ORAL), Take 1 tablet by mouth 2 times a day., Disp: , Rfl:     MULTIVIT 33-MTFOLATE-NAC-CHROM ORAL, Take by mouth., Disp: , Rfl:     ramelteon (Rozerem) 8 mg tablet, TAKE 1 TABLET BY MOUTH ONCE DAILY AT BEDTIME AS NEEDED FOR SLEEP, Disp: 90 tablet, Rfl: 0    tamsulosin (Flomax) 0.4 mg 24 hr capsule, Take 1 capsule by mouth once daily, Disp: 90 capsule, Rfl: 0    torsemide (Demadex) 20 mg tablet, Take 1 tablet (20 mg) by mouth once daily., Disp: , Rfl:     Vitamin D3 50 mcg (2,000 unit) capsule, Take 1 capsule by mouth once daily, Disp: 90 capsule, Rfl: 0  [2]   Allergies  Allergen Reactions    Bee Venom Protein (Honey Bee) Swelling    Doxepin Unknown     Felt drunk

## 2025-07-03 DIAGNOSIS — E78.00 HYPERCHOLESTEROLEMIA: ICD-10-CM

## 2025-07-03 RX ORDER — ATORVASTATIN CALCIUM 10 MG/1
10 TABLET, FILM COATED ORAL DAILY
Qty: 90 TABLET | Refills: 0 | Status: SHIPPED | OUTPATIENT
Start: 2025-07-03

## 2025-07-22 DIAGNOSIS — I10 ESSENTIAL HYPERTENSION: Primary | ICD-10-CM

## 2025-07-22 RX ORDER — TORSEMIDE 20 MG/1
20 TABLET ORAL 2 TIMES DAILY
Qty: 180 TABLET | Refills: 1 | Status: SHIPPED | OUTPATIENT
Start: 2025-07-22

## 2025-08-09 DIAGNOSIS — E55.9 VITAMIN D DEFICIENCY: ICD-10-CM

## 2025-08-11 ENCOUNTER — APPOINTMENT (OUTPATIENT)
Dept: PRIMARY CARE | Facility: CLINIC | Age: 85
End: 2025-08-11
Payer: MEDICARE

## 2025-08-11 VITALS
DIASTOLIC BLOOD PRESSURE: 70 MMHG | HEIGHT: 67 IN | BODY MASS INDEX: 31.39 KG/M2 | WEIGHT: 200 LBS | TEMPERATURE: 98.1 F | SYSTOLIC BLOOD PRESSURE: 124 MMHG

## 2025-08-11 DIAGNOSIS — J41.0 SIMPLE CHRONIC BRONCHITIS (MULTI): ICD-10-CM

## 2025-08-11 DIAGNOSIS — I10 BENIGN ESSENTIAL HYPERTENSION: Primary | ICD-10-CM

## 2025-08-11 DIAGNOSIS — E55.9 VITAMIN D DEFICIENCY: ICD-10-CM

## 2025-08-11 DIAGNOSIS — R60.0 BILATERAL LEG EDEMA: ICD-10-CM

## 2025-08-11 DIAGNOSIS — F51.01 PRIMARY INSOMNIA: ICD-10-CM

## 2025-08-11 DIAGNOSIS — R73.03 PREDIABETES: ICD-10-CM

## 2025-08-11 PROCEDURE — 3078F DIAST BP <80 MM HG: CPT | Performed by: PHYSICIAN ASSISTANT

## 2025-08-11 PROCEDURE — 1159F MED LIST DOCD IN RCRD: CPT | Performed by: PHYSICIAN ASSISTANT

## 2025-08-11 PROCEDURE — 99214 OFFICE O/P EST MOD 30 MIN: CPT | Performed by: PHYSICIAN ASSISTANT

## 2025-08-11 PROCEDURE — 3074F SYST BP LT 130 MM HG: CPT | Performed by: PHYSICIAN ASSISTANT

## 2025-08-11 PROCEDURE — 1160F RVW MEDS BY RX/DR IN RCRD: CPT | Performed by: PHYSICIAN ASSISTANT

## 2025-08-11 ASSESSMENT — ENCOUNTER SYMPTOMS
CHILLS: 0
ABDOMINAL PAIN: 0
LOSS OF SENSATION IN FEET: 0
ARTHRALGIAS: 0
WEAKNESS: 0
CHOKING: 0
DIFFICULTY URINATING: 0
DEPRESSION: 0
COLOR CHANGE: 0
MYALGIAS: 0
HEMATURIA: 0
POLYPHAGIA: 0
VOMITING: 0
DIARRHEA: 0
FEVER: 0
POLYDIPSIA: 0
NERVOUS/ANXIOUS: 0
NUMBNESS: 0
ANAL BLEEDING: 0
JOINT SWELLING: 0
APPETITE CHANGE: 0
HEADACHES: 0
WHEEZING: 0
FATIGUE: 0
SORE THROAT: 0
SLEEP DISTURBANCE: 0
CHEST TIGHTNESS: 0
CONFUSION: 0
EYE DISCHARGE: 0
DIZZINESS: 0
FREQUENCY: 0
TREMORS: 0
ABDOMINAL DISTENTION: 0
NAUSEA: 0
PALPITATIONS: 0
SHORTNESS OF BREATH: 0
OCCASIONAL FEELINGS OF UNSTEADINESS: 0
COUGH: 0
EYE PAIN: 0
FACIAL SWELLING: 0
CONSTIPATION: 0

## 2025-08-11 ASSESSMENT — PATIENT HEALTH QUESTIONNAIRE - PHQ9
2. FEELING DOWN, DEPRESSED OR HOPELESS: NOT AT ALL
SUM OF ALL RESPONSES TO PHQ9 QUESTIONS 1 AND 2: 0
1. LITTLE INTEREST OR PLEASURE IN DOING THINGS: NOT AT ALL

## 2025-08-20 ENCOUNTER — APPOINTMENT (OUTPATIENT)
Dept: UROLOGY | Facility: CLINIC | Age: 85
End: 2025-08-20
Payer: MEDICARE

## 2025-08-20 DIAGNOSIS — N40.1 BPH WITH URINARY OBSTRUCTION: ICD-10-CM

## 2025-08-20 DIAGNOSIS — N13.8 BPH WITH URINARY OBSTRUCTION: ICD-10-CM

## 2025-08-20 PROCEDURE — 1159F MED LIST DOCD IN RCRD: CPT | Performed by: UROLOGY

## 2025-08-20 PROCEDURE — 99214 OFFICE O/P EST MOD 30 MIN: CPT | Performed by: UROLOGY

## 2025-08-20 PROCEDURE — G2211 COMPLEX E/M VISIT ADD ON: HCPCS | Performed by: UROLOGY

## 2025-08-20 PROCEDURE — 1126F AMNT PAIN NOTED NONE PRSNT: CPT | Performed by: UROLOGY

## 2025-08-20 RX ORDER — TAMSULOSIN HYDROCHLORIDE 0.4 MG/1
0.4 CAPSULE ORAL DAILY
Qty: 90 CAPSULE | Refills: 3 | Status: SHIPPED | OUTPATIENT
Start: 2025-08-20 | End: 2026-08-20

## 2025-08-20 ASSESSMENT — PAIN SCALES - GENERAL: PAINLEVEL_OUTOF10: 0-NO PAIN

## 2025-08-23 DIAGNOSIS — N40.1 BPH WITH URINARY OBSTRUCTION: ICD-10-CM

## 2025-08-23 DIAGNOSIS — N13.8 BPH WITH URINARY OBSTRUCTION: ICD-10-CM

## 2025-08-28 RX ORDER — FINASTERIDE 5 MG/1
5 TABLET, FILM COATED ORAL DAILY
Qty: 90 TABLET | Refills: 3 | Status: SHIPPED | OUTPATIENT
Start: 2025-08-28 | End: 2026-08-28

## 2025-10-27 ENCOUNTER — APPOINTMENT (OUTPATIENT)
Dept: NEPHROLOGY | Facility: CLINIC | Age: 85
End: 2025-10-27
Payer: MEDICARE

## 2025-11-17 ENCOUNTER — APPOINTMENT (OUTPATIENT)
Dept: PRIMARY CARE | Facility: CLINIC | Age: 85
End: 2025-11-17
Payer: MEDICARE

## 2026-04-27 ENCOUNTER — APPOINTMENT (OUTPATIENT)
Dept: PRIMARY CARE | Facility: CLINIC | Age: 86
End: 2026-04-27
Payer: MEDICARE

## 2026-08-21 ENCOUNTER — APPOINTMENT (OUTPATIENT)
Dept: UROLOGY | Facility: CLINIC | Age: 86
End: 2026-08-21
Payer: MEDICARE

## (undated) DEVICE — ANGIO KIT, LEFT HEART, LF, CUSTOM

## (undated) DEVICE — CAUTERY, PENCIL, PUSH BUTTON, SMOKE EVAC, 70MM

## (undated) DEVICE — GLIDEWIRE, ANGLE, STANDARD, .035 X 80CM

## (undated) DEVICE — INTRODUCER SYSTEM, PRELUDE SNAP, SPLITTABLE, HEMOSTATIC, 6FR

## (undated) DEVICE — Device